# Patient Record
Sex: MALE | Race: WHITE | NOT HISPANIC OR LATINO | Employment: OTHER | URBAN - METROPOLITAN AREA
[De-identification: names, ages, dates, MRNs, and addresses within clinical notes are randomized per-mention and may not be internally consistent; named-entity substitution may affect disease eponyms.]

---

## 2023-04-28 DIAGNOSIS — I10 PRIMARY HYPERTENSION: Primary | ICD-10-CM

## 2023-04-29 LAB
ALANINE AMINOTRANSFERASE (SGPT) (U/L) IN SER/PLAS: 7 U/L (ref 10–52)
ALBUMIN (G/DL) IN SER/PLAS: 4.5 G/DL (ref 3.4–5)
ALKALINE PHOSPHATASE (U/L) IN SER/PLAS: 86 U/L (ref 33–136)
ANION GAP IN SER/PLAS: 14 MMOL/L (ref 10–20)
ASPARTATE AMINOTRANSFERASE (SGOT) (U/L) IN SER/PLAS: 26 U/L (ref 9–39)
BILIRUBIN TOTAL (MG/DL) IN SER/PLAS: 1 MG/DL (ref 0–1.2)
CALCIUM (MG/DL) IN SER/PLAS: 9.5 MG/DL (ref 8.6–10.6)
CARBON DIOXIDE, TOTAL (MMOL/L) IN SER/PLAS: 29 MMOL/L (ref 21–32)
CHLORIDE (MMOL/L) IN SER/PLAS: 103 MMOL/L (ref 98–107)
CHOLESTEROL (MG/DL) IN SER/PLAS: 112 MG/DL (ref 0–199)
CHOLESTEROL IN HDL (MG/DL) IN SER/PLAS: 40.1 MG/DL
CHOLESTEROL/HDL RATIO: 2.8
CREATININE (MG/DL) IN SER/PLAS: 1.77 MG/DL (ref 0.5–1.3)
ERYTHROCYTE DISTRIBUTION WIDTH (RATIO) BY AUTOMATED COUNT: 13.5 % (ref 11.5–14.5)
ERYTHROCYTE MEAN CORPUSCULAR HEMOGLOBIN CONCENTRATION (G/DL) BY AUTOMATED: 32.4 G/DL (ref 32–36)
ERYTHROCYTE MEAN CORPUSCULAR VOLUME (FL) BY AUTOMATED COUNT: 94 FL (ref 80–100)
ERYTHROCYTES (10*6/UL) IN BLOOD BY AUTOMATED COUNT: 4.97 X10E12/L (ref 4.5–5.9)
ESTIMATED AVERAGE GLUCOSE FOR HBA1C: 146 MG/DL
GFR MALE: 41 ML/MIN/1.73M2
GLUCOSE (MG/DL) IN SER/PLAS: 170 MG/DL (ref 74–99)
HEMATOCRIT (%) IN BLOOD BY AUTOMATED COUNT: 46.6 % (ref 41–52)
HEMOGLOBIN (G/DL) IN BLOOD: 15.1 G/DL (ref 13.5–17.5)
HEMOGLOBIN A1C/HEMOGLOBIN TOTAL IN BLOOD: 6.7 %
LDL: 42 MG/DL (ref 0–99)
LEUKOCYTES (10*3/UL) IN BLOOD BY AUTOMATED COUNT: 11 X10E9/L (ref 4.4–11.3)
NRBC (PER 100 WBCS) BY AUTOMATED COUNT: 0 /100 WBC (ref 0–0)
PLATELETS (10*3/UL) IN BLOOD AUTOMATED COUNT: 204 X10E9/L (ref 150–450)
POTASSIUM (MMOL/L) IN SER/PLAS: 5.2 MMOL/L (ref 3.5–5.3)
PROSTATE SPECIFIC ANTIGEN,SCREEN: 0.63 NG/ML (ref 0–4)
PROTEIN TOTAL: 8 G/DL (ref 6.4–8.2)
SODIUM (MMOL/L) IN SER/PLAS: 141 MMOL/L (ref 136–145)
THYROTROPIN (MIU/L) IN SER/PLAS BY DETECTION LIMIT <= 0.05 MIU/L: 2.43 MIU/L (ref 0.44–3.98)
TRIGLYCERIDE (MG/DL) IN SER/PLAS: 150 MG/DL (ref 0–149)
UREA NITROGEN (MG/DL) IN SER/PLAS: 49 MG/DL (ref 6–23)
VLDL: 30 MG/DL (ref 0–40)

## 2023-05-01 RX ORDER — HYDROCHLOROTHIAZIDE 12.5 MG/1
TABLET ORAL
Qty: 90 TABLET | Refills: 1 | Status: SHIPPED | OUTPATIENT
Start: 2023-05-01 | End: 2023-05-24 | Stop reason: SDUPTHER

## 2023-05-08 ENCOUNTER — APPOINTMENT (OUTPATIENT)
Dept: PRIMARY CARE | Facility: CLINIC | Age: 73
End: 2023-05-08
Payer: COMMERCIAL

## 2023-05-15 ENCOUNTER — APPOINTMENT (OUTPATIENT)
Dept: PRIMARY CARE | Facility: CLINIC | Age: 73
End: 2023-05-15
Payer: COMMERCIAL

## 2023-05-24 ENCOUNTER — OFFICE VISIT (OUTPATIENT)
Dept: PRIMARY CARE | Facility: CLINIC | Age: 73
End: 2023-05-24
Payer: COMMERCIAL

## 2023-05-24 VITALS
WEIGHT: 282 LBS | HEART RATE: 88 BPM | OXYGEN SATURATION: 96 % | SYSTOLIC BLOOD PRESSURE: 130 MMHG | BODY MASS INDEX: 36.19 KG/M2 | DIASTOLIC BLOOD PRESSURE: 69 MMHG | HEIGHT: 74 IN

## 2023-05-24 DIAGNOSIS — I10 PRIMARY HYPERTENSION: ICD-10-CM

## 2023-05-24 DIAGNOSIS — E11.9 DIABETES MELLITUS WITHOUT COMPLICATION (MULTI): ICD-10-CM

## 2023-05-24 DIAGNOSIS — K74.69 OTHER CIRRHOSIS OF LIVER (MULTI): ICD-10-CM

## 2023-05-24 DIAGNOSIS — Z00.00 MEDICARE ANNUAL WELLNESS VISIT, SUBSEQUENT: Primary | ICD-10-CM

## 2023-05-24 DIAGNOSIS — E11.42 DIABETIC PERIPHERAL NEUROPATHY (MULTI): ICD-10-CM

## 2023-05-24 DIAGNOSIS — Z12.11 SCREENING FOR COLON CANCER: ICD-10-CM

## 2023-05-24 PROBLEM — E78.2 ELEVATED TRIGLYCERIDES WITH HIGH CHOLESTEROL: Status: ACTIVE | Noted: 2023-05-24

## 2023-05-24 PROBLEM — E78.2 MIXED HYPERLIPIDEMIA: Status: ACTIVE | Noted: 2018-09-12

## 2023-05-24 PROBLEM — E83.19 IRON OVERLOAD: Status: ACTIVE | Noted: 2023-05-24

## 2023-05-24 PROBLEM — K76.0 NAFLD (NONALCOHOLIC FATTY LIVER DISEASE): Status: ACTIVE | Noted: 2023-05-24

## 2023-05-24 PROBLEM — N18.30 CHRONIC KIDNEY DISEASE, STAGE 3 (MULTI): Status: ACTIVE | Noted: 2023-05-24

## 2023-05-24 PROBLEM — H25.812 MIXED TYPE AGE-RELATED CATARACT, LEFT EYE: Status: ACTIVE | Noted: 2023-05-24

## 2023-05-24 PROBLEM — K74.60 LIVER CIRRHOSIS (MULTI): Status: ACTIVE | Noted: 2023-05-24

## 2023-05-24 PROBLEM — M15.9 GENERALIZED OSTEOARTHROSIS, INVOLVING MULTIPLE SITES: Status: ACTIVE | Noted: 2023-05-24

## 2023-05-24 PROCEDURE — 1160F RVW MEDS BY RX/DR IN RCRD: CPT | Performed by: STUDENT IN AN ORGANIZED HEALTH CARE EDUCATION/TRAINING PROGRAM

## 2023-05-24 PROCEDURE — 1036F TOBACCO NON-USER: CPT | Performed by: STUDENT IN AN ORGANIZED HEALTH CARE EDUCATION/TRAINING PROGRAM

## 2023-05-24 PROCEDURE — G0439 PPPS, SUBSEQ VISIT: HCPCS | Performed by: STUDENT IN AN ORGANIZED HEALTH CARE EDUCATION/TRAINING PROGRAM

## 2023-05-24 PROCEDURE — 99214 OFFICE O/P EST MOD 30 MIN: CPT | Performed by: STUDENT IN AN ORGANIZED HEALTH CARE EDUCATION/TRAINING PROGRAM

## 2023-05-24 PROCEDURE — 3078F DIAST BP <80 MM HG: CPT | Performed by: STUDENT IN AN ORGANIZED HEALTH CARE EDUCATION/TRAINING PROGRAM

## 2023-05-24 PROCEDURE — 4010F ACE/ARB THERAPY RXD/TAKEN: CPT | Performed by: STUDENT IN AN ORGANIZED HEALTH CARE EDUCATION/TRAINING PROGRAM

## 2023-05-24 PROCEDURE — 3075F SYST BP GE 130 - 139MM HG: CPT | Performed by: STUDENT IN AN ORGANIZED HEALTH CARE EDUCATION/TRAINING PROGRAM

## 2023-05-24 PROCEDURE — 3044F HG A1C LEVEL LT 7.0%: CPT | Performed by: STUDENT IN AN ORGANIZED HEALTH CARE EDUCATION/TRAINING PROGRAM

## 2023-05-24 PROCEDURE — 1170F FXNL STATUS ASSESSED: CPT | Performed by: STUDENT IN AN ORGANIZED HEALTH CARE EDUCATION/TRAINING PROGRAM

## 2023-05-24 PROCEDURE — 1159F MED LIST DOCD IN RCRD: CPT | Performed by: STUDENT IN AN ORGANIZED HEALTH CARE EDUCATION/TRAINING PROGRAM

## 2023-05-24 RX ORDER — ASPIRIN 81 MG/1
81 TABLET ORAL DAILY
COMMUNITY
Start: 2018-01-18

## 2023-05-24 RX ORDER — CARBIDOPA AND LEVODOPA 25; 100 MG/1; MG/1
3 TABLET ORAL 3 TIMES DAILY
COMMUNITY
End: 2023-12-22

## 2023-05-24 RX ORDER — ATORVASTATIN CALCIUM 20 MG/1
20 TABLET, FILM COATED ORAL DAILY
COMMUNITY
End: 2023-10-12

## 2023-05-24 RX ORDER — LOSARTAN POTASSIUM 50 MG/1
50 TABLET ORAL DAILY
Qty: 90 TABLET | Refills: 1 | Status: SHIPPED | OUTPATIENT
Start: 2023-05-24 | End: 2023-10-23

## 2023-05-24 RX ORDER — DAPAGLIFLOZIN 10 MG/1
10 TABLET, FILM COATED ORAL DAILY
COMMUNITY
End: 2023-10-12

## 2023-05-24 RX ORDER — ALLOPURINOL 100 MG/1
100 TABLET ORAL DAILY
COMMUNITY
Start: 2022-03-02 | End: 2023-09-05

## 2023-05-24 RX ORDER — ACETAMINOPHEN 500 MG
500 TABLET ORAL
COMMUNITY
Start: 2021-02-11 | End: 2023-10-17 | Stop reason: SDUPTHER

## 2023-05-24 RX ORDER — HYDROCHLOROTHIAZIDE 12.5 MG/1
12.5 TABLET ORAL DAILY
Qty: 90 TABLET | Refills: 3 | Status: SHIPPED | OUTPATIENT
Start: 2023-05-24 | End: 2023-10-12

## 2023-05-24 RX ORDER — LISINOPRIL 40 MG/1
40 TABLET ORAL
COMMUNITY
End: 2023-05-24 | Stop reason: SINTOL

## 2023-05-24 RX ORDER — DULAGLUTIDE 1.5 MG/.5ML
1.5 INJECTION, SOLUTION SUBCUTANEOUS
COMMUNITY
Start: 2019-06-04 | End: 2023-09-05

## 2023-05-24 RX ORDER — MULTIVIT-MIN/FA/LYCOPEN/LUTEIN .4-300-25
1 TABLET ORAL DAILY
COMMUNITY

## 2023-05-24 RX ORDER — NIFEDIPINE 30 MG/1
30 TABLET, FILM COATED, EXTENDED RELEASE ORAL
COMMUNITY
End: 2023-10-12

## 2023-05-24 RX ORDER — METOPROLOL TARTRATE 50 MG/1
50 TABLET ORAL 2 TIMES DAILY
COMMUNITY
End: 2023-10-12

## 2023-05-24 ASSESSMENT — PATIENT HEALTH QUESTIONNAIRE - PHQ9
1. LITTLE INTEREST OR PLEASURE IN DOING THINGS: NOT AT ALL
2. FEELING DOWN, DEPRESSED OR HOPELESS: NOT AT ALL
SUM OF ALL RESPONSES TO PHQ9 QUESTIONS 1 AND 2: 0

## 2023-05-24 ASSESSMENT — ACTIVITIES OF DAILY LIVING (ADL)
DOING_HOUSEWORK: NEEDS ASSISTANCE
TAKING_MEDICATION: INDEPENDENT
DRESSING: INDEPENDENT
GROCERY_SHOPPING: NEEDS ASSISTANCE
BATHING: INDEPENDENT
MANAGING_FINANCES: INDEPENDENT

## 2023-05-24 NOTE — PROGRESS NOTES
"Subjective   Reason for Visit: Huber Lindsey is an 72 y.o. male here for a Medicare Wellness visit.     Past Medical, Surgical, and Family History reviewed and updated in chart.    Reviewed all medications by prescribing practitioner or clinical pharmacist (such as prescriptions, OTCs, herbal therapies and supplements) and documented in the medical record.    HPI     71y/o male with HTN , DM2, Hep C s/p completion of Rx, Parkinson'  Patient Care Team:  Chelsea Aguillon MD as PCP - General     Review of Systems    Constitutional: no chills, no fever and no night sweats.     Eyes: no blurred vision and no eyesight problems.     ENT: no hearing loss, no nasal congestion, no nasal discharge, no hoarseness and no sore throat.     Cardiovascular: no chest pain, no intermittent leg claudication, no lower extremity edema, no palpitations and no syncope.     Respiratory: no cough, no shortness of breath during exertion, no shortness of breath at rest and no wheezing.     Gastrointestinal: no abdominal pain, no constipation, no blood in stools, no diarrhea, no melena, no nausea, no rectal pain and no vomiting.     Genitourinary: no dysuria, no change in urinary frequency, no urinary hesitancy and no feelings of urinary urgency.     Musculoskeletal: no arthralgias, no back pain and no myalgias.     Integumentary: no new skin lesions and no rashes.     Neurological: no difficulty walking, no headache, no limb weakness, no numbness and no tingling.     Psychiatric: no anxiety, no depression, no anhedonia and no substance use disorders.     Endocrine: no recent weight gain and no recent weight loss.     Hematologic/Lymphatic: no tendency for easy bruising and no swollen glands.          All other systems have been reviewed and are negative for complaint.    Objective   Vitals:  /69   Pulse 88   Ht 1.88 m (6' 2\")   Wt 128 kg (282 lb)   SpO2 96%   BMI 36.21 kg/m²       Physical Exam  Constitutional: Alert and in " no acute distress. Well developed, well nourished.     Eyes: Normal external exam. Pupils were equal in size, round, reactive to light (PERRL) with normal accommodation and extraocular movements intact (EOMI).     Ears, Nose, Mouth, and Throat: External inspection of ears and nose: Normal.  Otoscopic examination: Normal.      Neck: No neck mass was observed. Supple.     Cardiovascular: Heart rate and rhythm were normal, normal S1 and S2, no gallops, no murmurs and no pericardial rub    Pulmonary: No respiratory distress. Clear bilateral breath sounds.     Abdomen:  limited due to obesity     Musculoskeletal: No joint swelling seen, normal movements of all extremities. Range of motion: Normal.  Muscle strength/tone: Normal.          Neurologic: Deep tendon reflexes were 2+ and symmetric. Sensation: Normal.     Psychiatric: Judgment and insight: Intact. Mood and affect: Normal.    Assessment/Plan   Problem List Items Addressed This Visit          Nervous    Diabetic peripheral neuropathy (CMS/HCC)       Circulatory    HTN (hypertension)    Overview     Formatting of this note might be different from the original. Overview: Continue Amlodipine and lisinopril Hold HCTZ         Relevant Medications    hydroCHLOROthiazide (HYDRODiuril) 12.5 mg tablet    losartan (Cozaar) 50 mg tablet    Other Relevant Orders    Basic Metabolic Panel       Digestive    Liver cirrhosis (CMS/HCC)     Other Visit Diagnoses       Medicare annual wellness visit, subsequent    -  Primary    Diabetes mellitus without complication (CMS/HCC)        Screening for colon cancer        Relevant Orders    Cologuard® colon cancer screening        73y/o male with HTN , DM2, Hep C s/p completion of Rx, Parkinson'    Chronic medical conditions: Reviewed , assessed , stable, meds refilled.   - HTN : Stopped Lisinopril due to hyperkalemia , start Losartan 50mg   Rpt  K levels in 1m after starting Losartan 50mg   - DM2     Conditions addressed and mgmt as  noted above.  Pertinent labs, images/ imaging reports , chart review was done .   Age appropriate labs / labs for mgmt of chronic medical conditions ordered, further mgmt pending the results.       Influenza: influenza vaccine was previously given.   Pneumovax 23/Prevnar 15: Pneumovax 23/Prevnar 15 vaccine was previously given.   Prevnar 20: Prevnar 20 vaccine was previously given.   Shingles Vaccine: Shingles vaccine was given previously  Prostate cancer screening: Screening current  Colorectal Cancer Screening: Sister reported getting the colonoscopy done in the last 10 years, suggested that she call that physician's office and find out when he is due for the next one. Cologuard was reportedly received t home but never mailed back with stool sample . [place] guard ordered  Abdominal Aortic Aneurysm screening: screening not indicated.

## 2023-06-28 ENCOUNTER — LAB (OUTPATIENT)
Dept: LAB | Facility: LAB | Age: 73
End: 2023-06-28
Payer: COMMERCIAL

## 2023-06-28 DIAGNOSIS — I10 PRIMARY HYPERTENSION: ICD-10-CM

## 2023-06-28 DIAGNOSIS — E11.9 DIABETES MELLITUS WITHOUT COMPLICATION (MULTI): ICD-10-CM

## 2023-06-28 LAB
ANION GAP IN SER/PLAS: 14 MMOL/L (ref 10–20)
CALCIUM (MG/DL) IN SER/PLAS: 9.9 MG/DL (ref 8.6–10.6)
CARBON DIOXIDE, TOTAL (MMOL/L) IN SER/PLAS: 28 MMOL/L (ref 21–32)
CHLORIDE (MMOL/L) IN SER/PLAS: 101 MMOL/L (ref 98–107)
CREATININE (MG/DL) IN SER/PLAS: 1.55 MG/DL (ref 0.5–1.3)
GFR MALE: 48 ML/MIN/1.73M2
GLUCOSE (MG/DL) IN SER/PLAS: 172 MG/DL (ref 74–99)
POTASSIUM (MMOL/L) IN SER/PLAS: 5.3 MMOL/L (ref 3.5–5.3)
SODIUM (MMOL/L) IN SER/PLAS: 138 MMOL/L (ref 136–145)
UREA NITROGEN (MG/DL) IN SER/PLAS: 37 MG/DL (ref 6–23)

## 2023-06-28 PROCEDURE — 80048 BASIC METABOLIC PNL TOTAL CA: CPT

## 2023-06-28 PROCEDURE — 36415 COLL VENOUS BLD VENIPUNCTURE: CPT

## 2023-06-29 ENCOUNTER — TELEPHONE (OUTPATIENT)
Dept: PRIMARY CARE | Facility: CLINIC | Age: 73
End: 2023-06-29
Payer: COMMERCIAL

## 2023-06-29 NOTE — TELEPHONE ENCOUNTER
----- Message from Chelsea Aguillon MD sent at 6/29/2023  2:42 PM EDT -----  Potassium levels are normal

## 2023-08-02 LAB
ALBUMIN (G/DL) IN SER/PLAS: 4.4 G/DL (ref 3.4–5)
ALBUMIN (MG/L) IN URINE: 7.9 MG/L
ALBUMIN/CREATININE (UG/MG) IN URINE: 8.5 UG/MG CRT (ref 0–30)
ANION GAP IN SER/PLAS: 13 MMOL/L (ref 10–20)
APPEARANCE, URINE: CLEAR
BILIRUBIN, URINE: NEGATIVE
BLOOD, URINE: NEGATIVE
CALCIDIOL (25 OH VITAMIN D3) (NG/ML) IN SER/PLAS: 27 NG/ML
CALCIUM (MG/DL) IN SER/PLAS: 9.7 MG/DL (ref 8.6–10.6)
CARBON DIOXIDE, TOTAL (MMOL/L) IN SER/PLAS: 29 MMOL/L (ref 21–32)
CHLORIDE (MMOL/L) IN SER/PLAS: 104 MMOL/L (ref 98–107)
COLOR, URINE: YELLOW
CREATININE (MG/DL) IN SER/PLAS: 1.68 MG/DL (ref 0.5–1.3)
CREATININE (MG/DL) IN URINE: 93.3 MG/DL (ref 20–370)
CREATININE (MG/DL) IN URINE: 93.3 MG/DL (ref 20–370)
ERYTHROCYTE DISTRIBUTION WIDTH (RATIO) BY AUTOMATED COUNT: 14 % (ref 11.5–14.5)
ERYTHROCYTE MEAN CORPUSCULAR HEMOGLOBIN CONCENTRATION (G/DL) BY AUTOMATED: 32.4 G/DL (ref 32–36)
ERYTHROCYTE MEAN CORPUSCULAR VOLUME (FL) BY AUTOMATED COUNT: 94 FL (ref 80–100)
ERYTHROCYTES (10*6/UL) IN BLOOD BY AUTOMATED COUNT: 4.94 X10E12/L (ref 4.5–5.9)
GFR MALE: 43 ML/MIN/1.73M2
GLUCOSE (MG/DL) IN SER/PLAS: 195 MG/DL (ref 74–99)
GLUCOSE, URINE: ABNORMAL MG/DL
HEMATOCRIT (%) IN BLOOD BY AUTOMATED COUNT: 46.6 % (ref 41–52)
HEMOGLOBIN (G/DL) IN BLOOD: 15.1 G/DL (ref 13.5–17.5)
KETONES, URINE: ABNORMAL MG/DL
LEUKOCYTE ESTERASE, URINE: NEGATIVE
LEUKOCYTES (10*3/UL) IN BLOOD BY AUTOMATED COUNT: 10.3 X10E9/L (ref 4.4–11.3)
NITRITE, URINE: NEGATIVE
NRBC (PER 100 WBCS) BY AUTOMATED COUNT: 0 /100 WBC (ref 0–0)
PH, URINE: 5 (ref 5–8)
PHOSPHATE (MG/DL) IN SER/PLAS: 2.7 MG/DL (ref 2.5–4.9)
PLATELETS (10*3/UL) IN BLOOD AUTOMATED COUNT: 225 X10E9/L (ref 150–450)
POTASSIUM (MMOL/L) IN SER/PLAS: 4.6 MMOL/L (ref 3.5–5.3)
PROTEIN (MG/DL) IN URINE: 9 MG/DL (ref 5–25)
PROTEIN, URINE: NEGATIVE MG/DL
PROTEIN/CREATININE (MG/MG) IN URINE: 0.1 MG/MG CREAT (ref 0–0.17)
SODIUM (MMOL/L) IN SER/PLAS: 141 MMOL/L (ref 136–145)
SPECIFIC GRAVITY, URINE: 1.02 (ref 1–1.03)
UREA NITROGEN (MG/DL) IN SER/PLAS: 41 MG/DL (ref 6–23)
UROBILINOGEN, URINE: <2 MG/DL (ref 0–1.9)

## 2023-08-03 LAB
IMMUNOGLOBULIN LIGHT CHAINS KAPPA/LAMBDA (MASS RATIO) IN SERUM: 1.69 (ref 0.26–1.65)
IMMUNOGLOBULIN LIGHT CHAINS.KAPPA (MG/DL) IN SERUM: 6.55 MG/DL (ref 0.33–1.94)
IMMUNOGLOBULIN LIGHT CHAINS.LAMBDA (MG/DL) IN SERUM: 3.88 MG/DL (ref 0.57–2.63)
PARATHYRIN INTACT (PG/ML) IN SER/PLAS: 96.8 PG/ML (ref 18.5–88)

## 2023-08-06 LAB
ALBUMIN ELP: 4.2 G/DL (ref 3.4–5)
ALPHA 1: 0.4 G/DL (ref 0.2–0.6)
ALPHA 2: 0.9 G/DL (ref 0.4–1.1)
BETA: 0.9 G/DL (ref 0.5–1.2)
GAMMA GLOBULIN: 1.5 G/DL (ref 0.5–1.4)
PATH REVIEW - SERUM IMMUNOFIXATION: NORMAL
PATH REVIEW-SERUM PROTEIN ELECTROPHORESIS: NORMAL
PROTEIN ELECTROPHORESIS INTERPRETATION: ABNORMAL
PROTEIN TOTAL: 7.9 G/DL (ref 6.4–8.2)
SERUM IMMUNOFIXATION INTERPRETATION: NORMAL

## 2023-08-17 PROBLEM — R03.0 ELEVATED BLOOD-PRESSURE READING WITHOUT DIAGNOSIS OF HYPERTENSION: Status: ACTIVE | Noted: 2019-09-11

## 2023-08-17 PROBLEM — E79.0 HYPERURICEMIA: Status: ACTIVE | Noted: 2023-08-17

## 2023-08-17 PROBLEM — E66.812 CLASS 2 SEVERE OBESITY WITH SERIOUS COMORBIDITY AND BODY MASS INDEX (BMI) OF 36.0 TO 36.9 IN ADULT: Status: ACTIVE | Noted: 2023-08-17

## 2023-08-17 PROBLEM — H54.7 VISION IMPAIRMENT: Status: ACTIVE | Noted: 2023-08-17

## 2023-08-17 PROBLEM — Z79.85 LONG-TERM (CURRENT) USE OF INJECTABLE NON-INSULIN ANTIDIABETIC DRUGS: Status: ACTIVE | Noted: 2023-04-13

## 2023-08-17 PROBLEM — H25.812 COMBINED FORMS OF AGE-RELATED CATARACT OF LEFT EYE: Status: ACTIVE | Noted: 2023-04-13

## 2023-08-17 PROBLEM — L97.811: Status: ACTIVE | Noted: 2023-08-17

## 2023-08-17 PROBLEM — M25.552 LEFT HIP PAIN: Status: ACTIVE | Noted: 2023-08-17

## 2023-08-17 PROBLEM — H25.89 TOTAL, MATURE SENILE CATARACT: Status: ACTIVE | Noted: 2023-08-17

## 2023-08-17 PROBLEM — Z87.891 PERSONAL HISTORY OF NICOTINE DEPENDENCE: Status: ACTIVE | Noted: 2023-04-13

## 2023-08-17 PROBLEM — R55 SYNCOPE: Status: ACTIVE | Noted: 2017-12-19

## 2023-08-17 PROBLEM — E11.42 TYPE 2 DIABETES MELLITUS WITH DIABETIC POLYNEUROPATHY (MULTI): Status: ACTIVE | Noted: 2023-04-13

## 2023-08-17 PROBLEM — E66.01 CLASS 2 SEVERE OBESITY WITH SERIOUS COMORBIDITY AND BODY MASS INDEX (BMI) OF 36.0 TO 36.9 IN ADULT (MULTI): Status: ACTIVE | Noted: 2023-08-17

## 2023-08-17 PROBLEM — M70.61 TROCHANTERIC BURSITIS OF RIGHT HIP: Status: ACTIVE | Noted: 2023-08-17

## 2023-08-17 PROBLEM — Z79.84 LONG TERM (CURRENT) USE OF ORAL HYPOGLYCEMIC DRUGS: Status: ACTIVE | Noted: 2023-04-13

## 2023-08-17 PROBLEM — Z79.82 LONG TERM (CURRENT) USE OF ASPIRIN: Status: ACTIVE | Noted: 2023-04-13

## 2023-08-17 PROBLEM — M15.9 POLYOSTEOARTHRITIS: Status: ACTIVE | Noted: 2023-04-13

## 2023-08-17 PROBLEM — H61.23 BILATERAL IMPACTED CERUMEN: Status: ACTIVE | Noted: 2023-04-13

## 2023-08-17 PROBLEM — G20.A1 PARKINSONS DISEASE (MULTI): Status: ACTIVE | Noted: 2023-04-13

## 2023-08-17 PROBLEM — R76.8 POSITIVE HEPATITIS C ANTIBODY TEST: Status: ACTIVE | Noted: 2023-08-17

## 2023-08-17 PROBLEM — R60.0 EDEMA, LOWER EXTREMITY: Status: ACTIVE | Noted: 2023-08-17

## 2023-08-17 PROBLEM — I12.9 HYPERTENSIVE CHRONIC KIDNEY DISEASE W STG 1-4/UNSP CHR KDNY: Status: ACTIVE | Noted: 2023-04-13

## 2023-08-17 PROBLEM — E11.51 TYPE 2 DIABETES MELLITUS WITH DIABETIC PERIPHERAL ANGIOPATHY WITHOUT GANGRENE (MULTI): Status: ACTIVE | Noted: 2023-04-13

## 2023-08-17 PROBLEM — R53.1 GENERALIZED WEAKNESS: Status: ACTIVE | Noted: 2023-08-17

## 2023-08-17 PROBLEM — I25.10 CORONARY ARTERY DISEASE INVOLVING NATIVE CORONARY ARTERY OF NATIVE HEART WITHOUT ANGINA PECTORIS: Status: ACTIVE | Noted: 2023-04-13

## 2023-08-17 PROBLEM — Z91.199 NON-COMPLIANCE WITH TREATMENT: Status: ACTIVE | Noted: 2023-08-17

## 2023-08-17 PROBLEM — L97.811 NON-PRESSURE CHRONIC ULCER OF OTHER PART OF RIGHT LOWER LEG LIMITED TO BREAKDOWN OF SKIN (MULTI): Status: ACTIVE | Noted: 2023-08-17

## 2023-08-17 PROBLEM — I87.2 VENOUS INSUFFICIENCY (CHRONIC) (PERIPHERAL): Status: RESOLVED | Noted: 2023-04-13 | Resolved: 2023-08-17

## 2023-08-17 PROBLEM — E66.811 OBESITY, CLASS I, BMI 30-34.9: Status: ACTIVE | Noted: 2018-05-14

## 2023-08-17 PROBLEM — R26.9 GAIT DISORDER: Status: ACTIVE | Noted: 2023-08-17

## 2023-08-17 PROBLEM — R79.0 RAISED SERUM IRON: Status: ACTIVE | Noted: 2023-08-17

## 2023-08-17 PROBLEM — M25.551 RIGHT HIP PAIN: Status: ACTIVE | Noted: 2023-08-17

## 2023-08-17 PROBLEM — E66.9 OBESITY, CLASS II, BMI 35-39.9: Status: ACTIVE | Noted: 2019-10-29

## 2023-08-17 PROBLEM — R74.01 TRANSAMINITIS: Status: ACTIVE | Noted: 2023-08-17

## 2023-08-17 PROBLEM — E66.812 OBESITY, CLASS II, BMI 35-39.9: Status: ACTIVE | Noted: 2019-10-29

## 2023-08-17 PROBLEM — Z86.39 H/O HYPOGLYCEMIA: Status: ACTIVE | Noted: 2023-08-17

## 2023-08-17 PROBLEM — R25.1 INTERMITTENT TREMOR: Status: ACTIVE | Noted: 2023-08-17

## 2023-08-17 PROBLEM — N17.9 AKI (ACUTE KIDNEY INJURY) (CMS-HCC): Status: ACTIVE | Noted: 2023-08-17

## 2023-08-17 PROBLEM — H54.7 VISUAL LOSS: Status: ACTIVE | Noted: 2023-04-13

## 2023-08-17 PROBLEM — E87.5 HYPERKALEMIA: Status: ACTIVE | Noted: 2023-08-17

## 2023-08-17 PROBLEM — M25.561 RIGHT KNEE PAIN: Status: ACTIVE | Noted: 2023-08-17

## 2023-08-17 PROBLEM — E11.22 TYPE 2 DIABETES MELLITUS WITH DIABETIC CHRONIC KIDNEY DISEASE (MULTI): Status: ACTIVE | Noted: 2023-04-13

## 2023-08-17 PROBLEM — K52.9 NONINFECTIOUS GASTROENTERITIS: Status: ACTIVE | Noted: 2019-09-11

## 2023-08-17 PROBLEM — Z87.440 PERSONAL HISTORY OF URINARY (TRACT) INFECTIONS: Status: ACTIVE | Noted: 2023-04-13

## 2023-08-17 PROBLEM — R82.90 MALODOROUS URINE: Status: ACTIVE | Noted: 2023-08-17

## 2023-08-17 PROBLEM — E66.9 OBESITY, CLASS I, BMI 30-34.9: Status: ACTIVE | Noted: 2018-05-14

## 2023-08-17 PROBLEM — I87.2 VENOUS INCOMPETENCE: Status: ACTIVE | Noted: 2023-08-17

## 2023-08-17 PROBLEM — R79.89 HIGH SERUM TRANSFERRIN SATURATION: Status: ACTIVE | Noted: 2023-08-17

## 2023-08-17 RX ORDER — AMLODIPINE AND VALSARTAN 10; 160 MG/1; MG/1
10 TABLET ORAL DAILY
COMMUNITY
End: 2023-10-17

## 2023-08-17 RX ORDER — FAMOTIDINE 20 MG/1
1 TABLET, FILM COATED ORAL NIGHTLY
COMMUNITY
Start: 2021-02-11

## 2023-08-17 RX ORDER — FLUTICASONE PROPIONATE 50 MCG
SPRAY, SUSPENSION (ML) NASAL
COMMUNITY
Start: 2019-08-06

## 2023-08-17 RX ORDER — POLYETHYLENE GLYCOL 3350 17 G/17G
1 POWDER, FOR SOLUTION ORAL DAILY
COMMUNITY
Start: 2021-02-11 | End: 2023-10-17

## 2023-08-17 RX ORDER — DEXTROSE 40 %
GEL (GRAM) ORAL
COMMUNITY
Start: 2018-01-22 | End: 2023-10-17

## 2023-08-17 RX ORDER — ACETAMINOPHEN 325 MG/1
650 TABLET ORAL EVERY 8 HOURS PRN
COMMUNITY
Start: 2019-11-26 | End: 2023-10-17

## 2023-08-17 RX ORDER — LISINOPRIL 20 MG/1
20 TABLET ORAL DAILY
COMMUNITY
End: 2023-10-17

## 2023-08-17 RX ORDER — METOPROLOL TARTRATE 25 MG/1
25 TABLET, FILM COATED ORAL EVERY 12 HOURS
COMMUNITY
Start: 2020-01-13 | End: 2023-10-17 | Stop reason: SDUPTHER

## 2023-08-17 RX ORDER — INSULIN GLARGINE 100 [IU]/ML
INJECTION, SOLUTION SUBCUTANEOUS NIGHTLY
COMMUNITY
End: 2023-10-17

## 2023-08-17 RX ORDER — ACETAMINOPHEN 500 MG
500 TABLET ORAL 2 TIMES DAILY
COMMUNITY
Start: 2023-03-01

## 2023-08-17 RX ORDER — DOCUSATE SODIUM 100 MG/1
100 CAPSULE, LIQUID FILLED ORAL 2 TIMES DAILY
COMMUNITY
Start: 2023-03-01 | End: 2023-10-17

## 2023-08-17 RX ORDER — GLIPIZIDE 10 MG/1
10 TABLET ORAL
COMMUNITY
End: 2023-10-17

## 2023-08-17 RX ORDER — DULAGLUTIDE 0.75 MG/.5ML
INJECTION, SOLUTION SUBCUTANEOUS
COMMUNITY
End: 2023-10-17

## 2023-08-17 RX ORDER — DOXYCYCLINE 100 MG/1
100 CAPSULE ORAL 2 TIMES DAILY
COMMUNITY
Start: 2023-01-18 | End: 2023-10-17

## 2023-08-17 RX ORDER — LISINOPRIL 40 MG/1
TABLET ORAL
COMMUNITY
Start: 2021-08-13 | End: 2023-10-17

## 2023-08-31 DIAGNOSIS — E79.0 HYPERURICEMIA: ICD-10-CM

## 2023-08-31 DIAGNOSIS — E11.9 DIABETES MELLITUS WITHOUT COMPLICATION (MULTI): ICD-10-CM

## 2023-08-31 DIAGNOSIS — M1A.9XX0 CHRONIC GOUT WITHOUT TOPHUS, UNSPECIFIED CAUSE, UNSPECIFIED SITE: ICD-10-CM

## 2023-09-05 RX ORDER — ALLOPURINOL 100 MG/1
100 TABLET ORAL DAILY
Qty: 90 TABLET | Refills: 3 | Status: SHIPPED | OUTPATIENT
Start: 2023-09-05

## 2023-09-05 RX ORDER — DULAGLUTIDE 1.5 MG/.5ML
1.5 INJECTION, SOLUTION SUBCUTANEOUS
Qty: 6 ML | Refills: 3 | Status: SHIPPED | OUTPATIENT
Start: 2023-09-05

## 2023-10-11 DIAGNOSIS — E11.9 DIABETES MELLITUS WITHOUT COMPLICATION (MULTI): Primary | ICD-10-CM

## 2023-10-11 DIAGNOSIS — I10 PRIMARY HYPERTENSION: ICD-10-CM

## 2023-10-11 DIAGNOSIS — E78.2 MIXED HYPERLIPIDEMIA: ICD-10-CM

## 2023-10-12 RX ORDER — DAPAGLIFLOZIN 10 MG/1
10 TABLET, FILM COATED ORAL
Qty: 90 TABLET | Refills: 1 | Status: SHIPPED | OUTPATIENT
Start: 2023-10-12 | End: 2024-03-19

## 2023-10-12 RX ORDER — NIFEDIPINE 30 MG/1
30 TABLET, FILM COATED, EXTENDED RELEASE ORAL DAILY
Qty: 90 TABLET | Refills: 1 | Status: SHIPPED | OUTPATIENT
Start: 2023-10-12 | End: 2024-03-19

## 2023-10-12 RX ORDER — HYDROCHLOROTHIAZIDE 12.5 MG/1
12.5 TABLET ORAL DAILY
Qty: 90 TABLET | Refills: 1 | Status: SHIPPED | OUTPATIENT
Start: 2023-10-12 | End: 2024-03-19

## 2023-10-12 RX ORDER — ATORVASTATIN CALCIUM 20 MG/1
20 TABLET, FILM COATED ORAL DAILY
Qty: 90 TABLET | Refills: 3 | Status: SHIPPED | OUTPATIENT
Start: 2023-10-12

## 2023-10-12 RX ORDER — METOPROLOL TARTRATE 50 MG/1
50 TABLET ORAL EVERY 12 HOURS
Qty: 180 TABLET | Refills: 1 | Status: SHIPPED | OUTPATIENT
Start: 2023-10-12 | End: 2024-03-19

## 2023-10-17 ENCOUNTER — OFFICE VISIT (OUTPATIENT)
Dept: NEUROLOGY | Facility: CLINIC | Age: 73
End: 2023-10-17
Payer: COMMERCIAL

## 2023-10-17 VITALS
RESPIRATION RATE: 18 BRPM | BODY MASS INDEX: 37.23 KG/M2 | SYSTOLIC BLOOD PRESSURE: 126 MMHG | DIASTOLIC BLOOD PRESSURE: 79 MMHG | WEIGHT: 290 LBS | HEART RATE: 97 BPM

## 2023-10-17 DIAGNOSIS — G20.A1 PARKINSON'S DISEASE WITHOUT DYSKINESIA OR FLUCTUATING MANIFESTATIONS (MULTI): Primary | ICD-10-CM

## 2023-10-17 PROCEDURE — 1160F RVW MEDS BY RX/DR IN RCRD: CPT | Performed by: PSYCHIATRY & NEUROLOGY

## 2023-10-17 PROCEDURE — 1159F MED LIST DOCD IN RCRD: CPT | Performed by: PSYCHIATRY & NEUROLOGY

## 2023-10-17 PROCEDURE — 1125F AMNT PAIN NOTED PAIN PRSNT: CPT | Performed by: PSYCHIATRY & NEUROLOGY

## 2023-10-17 PROCEDURE — 4010F ACE/ARB THERAPY RXD/TAKEN: CPT | Performed by: PSYCHIATRY & NEUROLOGY

## 2023-10-17 PROCEDURE — 3074F SYST BP LT 130 MM HG: CPT | Performed by: PSYCHIATRY & NEUROLOGY

## 2023-10-17 PROCEDURE — 3078F DIAST BP <80 MM HG: CPT | Performed by: PSYCHIATRY & NEUROLOGY

## 2023-10-17 PROCEDURE — 1036F TOBACCO NON-USER: CPT | Performed by: PSYCHIATRY & NEUROLOGY

## 2023-10-17 PROCEDURE — 3044F HG A1C LEVEL LT 7.0%: CPT | Performed by: PSYCHIATRY & NEUROLOGY

## 2023-10-17 PROCEDURE — 99213 OFFICE O/P EST LOW 20 MIN: CPT | Performed by: PSYCHIATRY & NEUROLOGY

## 2023-10-17 RX ORDER — CARBIDOPA AND LEVODOPA 25; 100 MG/1; MG/1
1 TABLET ORAL 3 TIMES DAILY
Qty: 810 TABLET | Refills: 4 | Status: SHIPPED | OUTPATIENT
Start: 2023-10-17 | End: 2024-03-25

## 2023-10-17 ASSESSMENT — PATIENT HEALTH QUESTIONNAIRE - PHQ9
SUM OF ALL RESPONSES TO PHQ9 QUESTIONS 1 AND 2: 0
1. LITTLE INTEREST OR PLEASURE IN DOING THINGS: NOT AT ALL
2. FEELING DOWN, DEPRESSED OR HOPELESS: NOT AT ALL

## 2023-10-17 ASSESSMENT — ENCOUNTER SYMPTOMS
OCCASIONAL FEELINGS OF UNSTEADINESS: 1
LOSS OF SENSATION IN FEET: 0
DEPRESSION: 0

## 2023-10-17 ASSESSMENT — PAIN SCALES - GENERAL: PAINLEVEL: 4

## 2023-10-17 NOTE — PATIENT INSTRUCTIONS
You were visited by Dr Gomez and Dr Hernandez today.  We keep the same dose.Continue Carbidopa/levodopa 3 tablets three times a day.

## 2023-10-18 NOTE — PROGRESS NOTES
Subjective     Huber Lindsey is a right handed  73 y.o. year old male who presents with Parkinson's Disease.   Visit type: follow up visit     HPI71 year old man with PMH of parkinsonism and DMII. No change in tremors. No falls. He always uses the walker.      Prior Hx:  Tremors began in 2018, action tremor in both hands. seen by Dr Cameron in Feb 2018. Tremor predominant Parkinson's disease Resting tremor intermittently seen on today's exam with re emergence during ambulation along with mild action tremors. CT head done due to gait disorder and urinary incontinence (occasional dribbling of urine) that showed ischemic changes but no signs of NPH.      Current PD Meds:  Carbidopa/levodopa 25/100 3 tabs 3x/day. No AEs.     FMHx: Updated  -Sister with PD               Review of Systems  All other system have been reviewed and are negative for complaint.  Objective   Neurological Exam    Physical Exam    UPDRS Examination The patient is currently on medication.   Speech: 0 Facial Expression: 1   Rest Tremor: Head: 0 RUE: 0 LUE: 0.5 RLE: 0 LLE: 0   Action Tremor: RUE: 1 LUE: 2   Rigidity: Neck: 1 RUE: 1 LUE: 1 RLE: 0.5 LLE: 0.5   Finger Taps: RUE: 1 LUE: 1   Hand Movements: RUE: 1 LUE: 1.5   Rapid Alternating Movements: RUE: 1 LUE: 1.5   Leg Agility: RLE: 1 LLE: 1   Arising from chair: 2 Posture: 1 Gait: 1.5 Body Bradykinsia: 1   Yahaira and Yahr Stage:                                  Assessment/Plan Huber Lindsey is a right handed  73 y.o. year old male who presents with Parkinson's Disease for follow up visit.He is doing great,and we plan continue the medication as is.Carbidopa/levodopa 3 tablets TID

## 2023-10-21 DIAGNOSIS — I10 PRIMARY HYPERTENSION: ICD-10-CM

## 2023-10-23 RX ORDER — LOSARTAN POTASSIUM 50 MG/1
50 TABLET ORAL DAILY
Qty: 90 TABLET | Refills: 3 | Status: SHIPPED | OUTPATIENT
Start: 2023-10-23

## 2023-11-29 ENCOUNTER — APPOINTMENT (OUTPATIENT)
Dept: PRIMARY CARE | Facility: CLINIC | Age: 73
End: 2023-11-29
Payer: COMMERCIAL

## 2023-11-30 ENCOUNTER — APPOINTMENT (OUTPATIENT)
Dept: PRIMARY CARE | Facility: CLINIC | Age: 73
End: 2023-11-30
Payer: COMMERCIAL

## 2023-12-05 DIAGNOSIS — G20.A1 PARKINSON'S DISEASE, UNSPECIFIED WHETHER DYSKINESIA PRESENT, UNSPECIFIED WHETHER MANIFESTATIONS FLUCTUATE (MULTI): ICD-10-CM

## 2023-12-06 RX ORDER — CARBIDOPA AND LEVODOPA 25; 100 MG/1; MG/1
TABLET ORAL
Qty: 810 TABLET | Refills: 0 | Status: SHIPPED | OUTPATIENT
Start: 2023-12-06 | End: 2024-03-25 | Stop reason: SDUPTHER

## 2023-12-20 ENCOUNTER — LAB (OUTPATIENT)
Dept: LAB | Facility: LAB | Age: 73
End: 2023-12-20
Payer: COMMERCIAL

## 2023-12-20 DIAGNOSIS — E11.9 DIABETES MELLITUS WITHOUT COMPLICATION (MULTI): ICD-10-CM

## 2023-12-20 DIAGNOSIS — N18.31 STAGE 3A CHRONIC KIDNEY DISEASE (MULTI): ICD-10-CM

## 2023-12-20 LAB
EST. AVERAGE GLUCOSE BLD GHB EST-MCNC: 146 MG/DL
HBA1C MFR BLD: 6.7 %
URATE SERPL-MCNC: 7.2 MG/DL (ref 4–7.5)

## 2023-12-20 PROCEDURE — 80048 BASIC METABOLIC PNL TOTAL CA: CPT

## 2023-12-20 PROCEDURE — 36415 COLL VENOUS BLD VENIPUNCTURE: CPT

## 2023-12-20 PROCEDURE — 83036 HEMOGLOBIN GLYCOSYLATED A1C: CPT

## 2023-12-20 PROCEDURE — 84550 ASSAY OF BLOOD/URIC ACID: CPT

## 2023-12-22 ENCOUNTER — OFFICE VISIT (OUTPATIENT)
Dept: PRIMARY CARE | Facility: CLINIC | Age: 73
End: 2023-12-22
Payer: COMMERCIAL

## 2023-12-22 VITALS
HEART RATE: 90 BPM | HEIGHT: 74 IN | BODY MASS INDEX: 35.58 KG/M2 | WEIGHT: 277.2 LBS | SYSTOLIC BLOOD PRESSURE: 130 MMHG | DIASTOLIC BLOOD PRESSURE: 79 MMHG | OXYGEN SATURATION: 93 %

## 2023-12-22 DIAGNOSIS — E11.22 TYPE 2 DIABETES MELLITUS WITH STAGE 3B CHRONIC KIDNEY DISEASE, WITHOUT LONG-TERM CURRENT USE OF INSULIN (MULTI): Primary | ICD-10-CM

## 2023-12-22 DIAGNOSIS — N18.31 STAGE 3A CHRONIC KIDNEY DISEASE (MULTI): ICD-10-CM

## 2023-12-22 DIAGNOSIS — D64.9 ANEMIA, UNSPECIFIED TYPE: ICD-10-CM

## 2023-12-22 DIAGNOSIS — E11.51 TYPE 2 DIABETES MELLITUS WITH DIABETIC PERIPHERAL ANGIOPATHY WITHOUT GANGRENE, WITHOUT LONG-TERM CURRENT USE OF INSULIN (MULTI): ICD-10-CM

## 2023-12-22 DIAGNOSIS — N18.32 TYPE 2 DIABETES MELLITUS WITH STAGE 3B CHRONIC KIDNEY DISEASE, WITHOUT LONG-TERM CURRENT USE OF INSULIN (MULTI): Primary | ICD-10-CM

## 2023-12-22 PROBLEM — L97.811: Status: RESOLVED | Noted: 2023-08-17 | Resolved: 2023-12-22

## 2023-12-22 PROBLEM — E66.812 CLASS 2 SEVERE OBESITY WITH SERIOUS COMORBIDITY AND BODY MASS INDEX (BMI) OF 36.0 TO 36.9 IN ADULT: Status: RESOLVED | Noted: 2023-08-17 | Resolved: 2023-12-22

## 2023-12-22 PROBLEM — L97.811 NON-PRESSURE CHRONIC ULCER OF OTHER PART OF RIGHT LOWER LEG LIMITED TO BREAKDOWN OF SKIN (MULTI): Status: RESOLVED | Noted: 2023-08-17 | Resolved: 2023-12-22

## 2023-12-22 PROBLEM — E66.01 CLASS 2 SEVERE OBESITY WITH SERIOUS COMORBIDITY AND BODY MASS INDEX (BMI) OF 36.0 TO 36.9 IN ADULT (MULTI): Status: RESOLVED | Noted: 2023-08-17 | Resolved: 2023-12-22

## 2023-12-22 LAB
ANION GAP SERPL CALC-SCNC: 23 MMOL/L (ref 10–20)
BUN SERPL-MCNC: 40 MG/DL (ref 6–23)
CALCIUM SERPL-MCNC: 9.4 MG/DL (ref 8.6–10.6)
CHLORIDE SERPL-SCNC: 103 MMOL/L (ref 98–107)
CO2 SERPL-SCNC: 21 MMOL/L (ref 21–32)
CREAT SERPL-MCNC: 1.53 MG/DL (ref 0.5–1.3)
GFR SERPL CREATININE-BSD FRML MDRD: 48 ML/MIN/1.73M*2
GLUCOSE SERPL-MCNC: 199 MG/DL (ref 74–99)
POTASSIUM SERPL-SCNC: 4.2 MMOL/L (ref 3.5–5.3)
SODIUM SERPL-SCNC: 143 MMOL/L (ref 136–145)

## 2023-12-22 PROCEDURE — 99214 OFFICE O/P EST MOD 30 MIN: CPT | Performed by: STUDENT IN AN ORGANIZED HEALTH CARE EDUCATION/TRAINING PROGRAM

## 2023-12-22 PROCEDURE — 1036F TOBACCO NON-USER: CPT | Performed by: STUDENT IN AN ORGANIZED HEALTH CARE EDUCATION/TRAINING PROGRAM

## 2023-12-22 PROCEDURE — 3078F DIAST BP <80 MM HG: CPT | Performed by: STUDENT IN AN ORGANIZED HEALTH CARE EDUCATION/TRAINING PROGRAM

## 2023-12-22 PROCEDURE — 1160F RVW MEDS BY RX/DR IN RCRD: CPT | Performed by: STUDENT IN AN ORGANIZED HEALTH CARE EDUCATION/TRAINING PROGRAM

## 2023-12-22 PROCEDURE — 1125F AMNT PAIN NOTED PAIN PRSNT: CPT | Performed by: STUDENT IN AN ORGANIZED HEALTH CARE EDUCATION/TRAINING PROGRAM

## 2023-12-22 PROCEDURE — 3044F HG A1C LEVEL LT 7.0%: CPT | Performed by: STUDENT IN AN ORGANIZED HEALTH CARE EDUCATION/TRAINING PROGRAM

## 2023-12-22 PROCEDURE — 1159F MED LIST DOCD IN RCRD: CPT | Performed by: STUDENT IN AN ORGANIZED HEALTH CARE EDUCATION/TRAINING PROGRAM

## 2023-12-22 PROCEDURE — 4010F ACE/ARB THERAPY RXD/TAKEN: CPT | Performed by: STUDENT IN AN ORGANIZED HEALTH CARE EDUCATION/TRAINING PROGRAM

## 2023-12-22 PROCEDURE — 3075F SYST BP GE 130 - 139MM HG: CPT | Performed by: STUDENT IN AN ORGANIZED HEALTH CARE EDUCATION/TRAINING PROGRAM

## 2023-12-22 ASSESSMENT — ENCOUNTER SYMPTOMS
LOSS OF SENSATION IN FEET: 0
OCCASIONAL FEELINGS OF UNSTEADINESS: 1
DEPRESSION: 0

## 2023-12-22 NOTE — PROGRESS NOTES
"Subjective   Patient ID: Huber Lindsey is a 73 y.o. male who presents for Follow-up (7 month follow-up. ).        HPI    74y/o male with HTN , DM2, Hep C s/p completion of Rx, Parkinson'     Visit Vitals  /79   Pulse 90   Ht 1.88 m (6' 2\")   Wt 126 kg (277 lb 3.2 oz)   SpO2 93%   BMI 35.59 kg/m²   Smoking Status Former   BSA 2.57 m²      No LMP for male patient.     Review of Systems    Constitutional : No feeling poorly / fevers/ chills / night sweats/ fatigue   Cardiovascular : No CP /Palpitations/ lower extremity edema / syncope   Respiratory : No Cough /HELLER/Dyspnea at rest   Gastrointestinal : No abd pain / N/V  No bloody stools/ melena / constipation  Endo : No polyuria/polydipsia/ muscle weakness / sluggishness   CNS: No confusion / HA/ tingling/ numbness/ weakness of extremities  Psychiatric: No anxiety/ depression/ SI/HI    All other systems have been reviewed and are negative for complaint       Physical Exam    Constitutional : Vitals reviewed. Alert and in no distress  Cardiovascular : RRR, Normal S1, S2, No pericardial rub/ gallop, no peripheral edema   Pulmonary: No respiratory distress, CTAB   MSK : Normal gait and station , strength and tone     Neurologic : CNs 2-12 grossly intact , no obvious FNDs  Psych : A,Ox3, normal mood and affect      Assessment/Plan   Diagnoses and all orders for this visit:  Type 2 diabetes mellitus with stage 3b chronic kidney disease, without long-term current use of insulin (CMS/[facility])  -     CBC; Future  -     Hemoglobin A1C; Future  -     Lipid Panel; Future  -     TSH with reflex to Free T4 if abnormal; Future  -     Albumin , Urine Random; Future  Stage 3a chronic kidney disease (CMS/[facility])  -     Comprehensive Metabolic Panel; Future  -     Basic Metabolic Panel; Future  Anemia, unspecified type  -     Vitamin B12; Future  Type 2 diabetes mellitus with diabetic peripheral angiopathy without gangrene, without long-term current use of insulin (CMS/[facility])      74y/o " male with HTN , DM2, Hep C s/p completion of Rx, Parkinson'      Dm2 : AT GOAL    Htn: AT GOAL     Rto I N mAY   Conditions addressed and mgmt as noted above.  Pertinent labs, images/ imaging reports , chart review was done .   Age appropriate labs / labs for mgmt of chronic medical conditions ordered, further mgmt pending the results.

## 2024-02-01 ENCOUNTER — LAB (OUTPATIENT)
Dept: LAB | Facility: LAB | Age: 74
End: 2024-02-01
Payer: COMMERCIAL

## 2024-02-01 DIAGNOSIS — E87.5 HYPERKALEMIA: Primary | ICD-10-CM

## 2024-02-01 DIAGNOSIS — N18.30 CHRONIC KIDNEY DISEASE, STAGE 3 UNSPECIFIED (MULTI): Primary | ICD-10-CM

## 2024-02-01 LAB
25(OH)D3 SERPL-MCNC: 43 NG/ML (ref 30–100)
ALBUMIN SERPL BCP-MCNC: 4.4 G/DL (ref 3.4–5)
ANION GAP SERPL CALC-SCNC: 15 MMOL/L (ref 10–20)
APPEARANCE UR: CLEAR
BILIRUB UR STRIP.AUTO-MCNC: NEGATIVE MG/DL
BUN SERPL-MCNC: 38 MG/DL (ref 6–23)
CALCIUM SERPL-MCNC: 9.6 MG/DL (ref 8.6–10.6)
CHLORIDE SERPL-SCNC: 100 MMOL/L (ref 98–107)
CO2 SERPL-SCNC: 27 MMOL/L (ref 21–32)
COLOR UR: YELLOW
CREAT SERPL-MCNC: 1.67 MG/DL (ref 0.5–1.3)
CREAT UR-MCNC: 146.6 MG/DL (ref 20–370)
CREAT UR-MCNC: 146.6 MG/DL (ref 20–370)
EGFRCR SERPLBLD CKD-EPI 2021: 43 ML/MIN/1.73M*2
ERYTHROCYTE [DISTWIDTH] IN BLOOD BY AUTOMATED COUNT: 13.8 % (ref 11.5–14.5)
GLUCOSE SERPL-MCNC: 178 MG/DL (ref 74–99)
GLUCOSE UR STRIP.AUTO-MCNC: ABNORMAL MG/DL
HCT VFR BLD AUTO: 43.7 % (ref 41–52)
HGB BLD-MCNC: 14.7 G/DL (ref 13.5–17.5)
KETONES UR STRIP.AUTO-MCNC: ABNORMAL MG/DL
LEUKOCYTE ESTERASE UR QL STRIP.AUTO: NEGATIVE
MCH RBC QN AUTO: 30.4 PG (ref 26–34)
MCHC RBC AUTO-ENTMCNC: 33.6 G/DL (ref 32–36)
MCV RBC AUTO: 91 FL (ref 80–100)
MICROALBUMIN UR-MCNC: 9.7 MG/L
MICROALBUMIN/CREAT UR: 6.6 UG/MG CREAT
NITRITE UR QL STRIP.AUTO: NEGATIVE
NRBC BLD-RTO: 0 /100 WBCS (ref 0–0)
PH UR STRIP.AUTO: 5 [PH]
PHOSPHATE SERPL-MCNC: 3.1 MG/DL (ref 2.5–4.9)
PLATELET # BLD AUTO: 256 X10*3/UL (ref 150–450)
POTASSIUM SERPL-SCNC: 4.4 MMOL/L (ref 3.5–5.3)
PROT UR STRIP.AUTO-MCNC: NEGATIVE MG/DL
PROT UR-ACNC: 12 MG/DL (ref 5–25)
PROT/CREAT UR: 0.08 MG/MG CREAT (ref 0–0.17)
PTH-INTACT SERPL-MCNC: 147.8 PG/ML (ref 18.5–88)
RBC # BLD AUTO: 4.83 X10*6/UL (ref 4.5–5.9)
RBC # UR STRIP.AUTO: NEGATIVE /UL
SODIUM SERPL-SCNC: 138 MMOL/L (ref 136–145)
SP GR UR STRIP.AUTO: 1.02
UROBILINOGEN UR STRIP.AUTO-MCNC: <2 MG/DL
WBC # BLD AUTO: 12.6 X10*3/UL (ref 4.4–11.3)

## 2024-02-01 PROCEDURE — 84156 ASSAY OF PROTEIN URINE: CPT

## 2024-02-01 PROCEDURE — 36415 COLL VENOUS BLD VENIPUNCTURE: CPT

## 2024-02-01 PROCEDURE — 81003 URINALYSIS AUTO W/O SCOPE: CPT

## 2024-02-01 PROCEDURE — 85027 COMPLETE CBC AUTOMATED: CPT

## 2024-02-01 PROCEDURE — 82570 ASSAY OF URINE CREATININE: CPT

## 2024-02-01 PROCEDURE — 80069 RENAL FUNCTION PANEL: CPT

## 2024-02-01 PROCEDURE — 82043 UR ALBUMIN QUANTITATIVE: CPT

## 2024-02-01 PROCEDURE — 83970 ASSAY OF PARATHORMONE: CPT

## 2024-02-01 PROCEDURE — 82306 VITAMIN D 25 HYDROXY: CPT

## 2024-02-02 RX ORDER — SODIUM ZIRCONIUM CYCLOSILICATE 10 G/10G
POWDER, FOR SUSPENSION ORAL
Qty: 90 PACKET | Refills: 3 | Status: SHIPPED | OUTPATIENT
Start: 2024-02-02

## 2024-02-07 ENCOUNTER — OFFICE VISIT (OUTPATIENT)
Dept: NEPHROLOGY | Facility: CLINIC | Age: 74
End: 2024-02-07
Payer: COMMERCIAL

## 2024-02-07 VITALS
TEMPERATURE: 98 F | DIASTOLIC BLOOD PRESSURE: 82 MMHG | WEIGHT: 273.2 LBS | SYSTOLIC BLOOD PRESSURE: 109 MMHG | BODY MASS INDEX: 35.08 KG/M2 | HEART RATE: 82 BPM

## 2024-02-07 DIAGNOSIS — N18.32 STAGE 3B CHRONIC KIDNEY DISEASE (MULTI): Primary | ICD-10-CM

## 2024-02-07 PROCEDURE — 1036F TOBACCO NON-USER: CPT | Performed by: INTERNAL MEDICINE

## 2024-02-07 PROCEDURE — 1125F AMNT PAIN NOTED PAIN PRSNT: CPT | Performed by: INTERNAL MEDICINE

## 2024-02-07 PROCEDURE — 3079F DIAST BP 80-89 MM HG: CPT | Performed by: INTERNAL MEDICINE

## 2024-02-07 PROCEDURE — 1159F MED LIST DOCD IN RCRD: CPT | Performed by: INTERNAL MEDICINE

## 2024-02-07 PROCEDURE — 1160F RVW MEDS BY RX/DR IN RCRD: CPT | Performed by: INTERNAL MEDICINE

## 2024-02-07 PROCEDURE — 4010F ACE/ARB THERAPY RXD/TAKEN: CPT | Performed by: INTERNAL MEDICINE

## 2024-02-07 PROCEDURE — 99213 OFFICE O/P EST LOW 20 MIN: CPT | Performed by: INTERNAL MEDICINE

## 2024-02-07 PROCEDURE — 3061F NEG MICROALBUMINURIA REV: CPT | Performed by: INTERNAL MEDICINE

## 2024-02-07 PROCEDURE — 3074F SYST BP LT 130 MM HG: CPT | Performed by: INTERNAL MEDICINE

## 2024-02-07 NOTE — PROGRESS NOTES
Chief Complaint: Follow up CKD    No specific complaints  Denies nausea, vomiting, chest pain, dyspnea  No urinary symptoms  Not taking lokelma    NAD  Sclera AI s inj  MMM, no sores  Deferred secondary to COVID  No edema  No tremor  No rash    CKD stage 3a-b  HTN well controlled  Proteinuria no significant  HPT    Labs and follow up in 3 months  No med changes

## 2024-02-12 ENCOUNTER — TELEPHONE (OUTPATIENT)
Dept: PRIMARY CARE | Facility: CLINIC | Age: 74
End: 2024-02-12
Payer: COMMERCIAL

## 2024-02-13 DIAGNOSIS — E11.9 DIABETES MELLITUS WITHOUT COMPLICATION (MULTI): ICD-10-CM

## 2024-02-13 RX ORDER — DULAGLUTIDE 0.75 MG/.5ML
0.75 INJECTION, SOLUTION SUBCUTANEOUS
Qty: 6 ML | Refills: 0 | Status: SHIPPED | OUTPATIENT
Start: 2024-02-13 | End: 2024-05-24 | Stop reason: ALTCHOICE

## 2024-03-18 DIAGNOSIS — I10 PRIMARY HYPERTENSION: ICD-10-CM

## 2024-03-18 DIAGNOSIS — E11.9 DIABETES MELLITUS WITHOUT COMPLICATION (MULTI): ICD-10-CM

## 2024-03-19 RX ORDER — NIFEDIPINE 30 MG/1
30 TABLET, FILM COATED, EXTENDED RELEASE ORAL DAILY
Qty: 90 TABLET | Refills: 1 | Status: SHIPPED | OUTPATIENT
Start: 2024-03-19

## 2024-03-19 RX ORDER — METOPROLOL TARTRATE 50 MG/1
50 TABLET ORAL EVERY 12 HOURS
Qty: 180 TABLET | Refills: 1 | Status: SHIPPED | OUTPATIENT
Start: 2024-03-19

## 2024-03-19 RX ORDER — DAPAGLIFLOZIN 10 MG/1
10 TABLET, FILM COATED ORAL EVERY MORNING
Qty: 90 TABLET | Refills: 1 | Status: SHIPPED | OUTPATIENT
Start: 2024-03-19

## 2024-03-19 RX ORDER — HYDROCHLOROTHIAZIDE 12.5 MG/1
12.5 TABLET ORAL DAILY
Qty: 90 TABLET | Refills: 1 | Status: SHIPPED | OUTPATIENT
Start: 2024-03-19 | End: 2024-05-24 | Stop reason: SDUPTHER

## 2024-03-25 DIAGNOSIS — G20.A1 PARKINSON'S DISEASE, UNSPECIFIED WHETHER DYSKINESIA PRESENT, UNSPECIFIED WHETHER MANIFESTATIONS FLUCTUATE (MULTI): ICD-10-CM

## 2024-03-25 RX ORDER — CARBIDOPA AND LEVODOPA 25; 100 MG/1; MG/1
TABLET ORAL
Qty: 810 TABLET | Refills: 0 | Status: SHIPPED | OUTPATIENT
Start: 2024-03-25

## 2024-04-18 ENCOUNTER — NURSING HOME VISIT (OUTPATIENT)
Dept: POST ACUTE CARE | Facility: EXTERNAL LOCATION | Age: 74
End: 2024-04-18
Payer: COMMERCIAL

## 2024-04-18 DIAGNOSIS — G20.A1 PARKINSON'S DISEASE, UNSPECIFIED WHETHER DYSKINESIA PRESENT, UNSPECIFIED WHETHER MANIFESTATIONS FLUCTUATE (MULTI): ICD-10-CM

## 2024-04-18 DIAGNOSIS — I10 PRIMARY HYPERTENSION: ICD-10-CM

## 2024-04-18 DIAGNOSIS — E11.9 TYPE 2 DIABETES MELLITUS WITHOUT COMPLICATION, UNSPECIFIED WHETHER LONG TERM INSULIN USE (MULTI): ICD-10-CM

## 2024-04-18 PROCEDURE — 99305 1ST NF CARE MODERATE MDM 35: CPT | Performed by: FAMILY MEDICINE

## 2024-04-18 NOTE — LETTER
Patient: Huber Lindsey  : 1950    Encounter Date: 2024    Huber Lindsey is a 73 y.o. male with Chief Complaint of [facility] H&P    Resident seen 2024 -- MELODY    CC: SN[facility] H&P    : 1950  SNF H&P done 24  DC Summary dated 24 reviewed 24  Allergy: Naproxen  Full Code    S: 72 yo man with stable PD, HTN, DM, Cirrhosis d/t hep C (Treated), depression admitted to SNF rehab after hospitalization for metabolic encephalopathy d/t UROSEPSIS. No cp, sob. Med List & Problem list reviewed.    O: VSS AFEB. 254# (24) Awake, Alert, NAD, oriented with mild baseline confusion. Chest cta. Heart rrr. Ext no c/c/e.    LAB (24) K 4.4, Cr 1.67, Na 138, GFR 43    A/P:  # PD: sinemet. SNF PT/OT. F/U w Neuro [facility].  # HTN: lopressor 50 DAILY, nifedipine 30, Losartan 50, HCTZ. OFF chlorthalidone since fall 7/10/2020.  # DM: stable on Trulicity 1.5, Farxiga, ASA, ARB, Statin. Off lantus.  # Edema: Off Lasix. Previous dry weight 256  # Gout: allopurinol 100 mg daily  # GERD: Pepcid.  # Hx UROSEPSIS: resolved.  .    Past Surgical History:   Procedure Laterality Date   • MR HEAD ANGIO WO IV CONTRAST  2013    MR HEAD ANGIO WO IV CONTRAST 2013 [facility]   • MR NECK ANGIO WO IV CONTRAST  2013    MR NECK ANGIO WO IV CONTRAST 2013 [facility]   • OTHER SURGICAL HISTORY  2018    Biopsy Of Liver   • RETINAL DETACHMENT SURGERY  2018    Repair Of Retinal Detachment   • US GUIDED NEEDLE LIVER BIOPSY  3/27/2018    US GUIDED NEEDLE LIVER BIOPSY 3/27/2018 [facility]      Social History     Socioeconomic History   • Marital status: Single     Spouse name: Not on file   • Number of children: Not on file   • Years of education: Not on file   • Highest education level: Not on file   Occupational History   • Not on file   Tobacco Use   • Smoking status: Former     Current packs/day: 0.00     Types: Cigarettes     Quit date: 1993     Years since  quittin.3   • Smokeless tobacco: Never   Substance and Sexual Activity   • Alcohol use: Not Currently   • Drug use: Not Currently   • Sexual activity: Not on file   Other Topics Concern   • Not on file   Social History Narrative   • Not on file     Social Determinants of Health     Financial Resource Strain: Not on file   Food Insecurity: No Food Insecurity (2024)    Received from [facility]    Hunger Vital Sign    • Worried About Running Out of Food in the Last Year: Never true    • Ran Out of Food in the Last Year: Never true   Transportation Needs: No Transportation Needs (2024)    Received from [facility]    PRAPARE - Transportation    • Lack of Transportation (Medical): No    • Lack of Transportation (Non-Medical): No   Physical Activity: Not on file   Stress: Not on file   Social Connections: Not on file   Intimate Partner Violence: Not on file   Housing Stability: High Risk (2024)    Received from [facility]    Housing Stability Vital Sign    • Unable to Pay for Housing in the Last Year: Yes    • Number of Places Lived in the Last Year: Not on file    • Unstable Housing in the Last Year: Yes     Past Medical History:   Diagnosis Date   • Age-related nuclear cataract, left eye 2019    Age-related nuclear cataract of left eye   • Age-related nuclear cataract, right eye 2019    Age-related nuclear cataract of right eye   • Chronic viral hepatitis C (Multi) 2022    Chronic hepatitis C   • Cortical age-related cataract, right eye 2019    Cortical age-related cataract of right eye   • Encounter for follow-up examination after completed treatment for conditions other than malignant neoplasm 2019    Hospital discharge follow-up   • Essential (primary) hypertension     Benign essential hypertension   • Essential (primary) hypertension 2022    Essential hypertension   • Parkinson's disease (Multi) 05/10/2022    Parkinsons disease   • Personal  history of other diseases of the circulatory system 12/04/2019    History of coronary artery disease   • Personal history of other diseases of the nervous system and sense organs 01/15/2020    History of peripheral neuropathy   • Personal history of other endocrine, nutritional and metabolic disease 10/11/2022    History of hyperkalemia   • Personal history of other specified conditions 01/15/2020    History of tachycardia   • Personal history of urinary (tract) infections 01/15/2020    History of urinary tract infection   • Polyosteoarthritis, unspecified 01/18/2018    Generalized osteoarthrosis, involving multiple sites   • Posterior subcapsular polar age-related cataract, right eye 02/19/2019    Posterior subcapsular polar age-related cataract of right eye   • Type 2 diabetes mellitus with mild nonproliferative diabetic retinopathy without macular edema, unspecified eye (Multi) 02/19/2019    NPDR (nonproliferative diabetic retinopathy)   • Type 2 diabetes mellitus without complications (Multi) 02/19/2019    Diabetes mellitus   • Type 2 diabetes mellitus without complications (Multi) 09/11/2021    Type 2 diabetes mellitus   • Unspecified cirrhosis of liver (Multi) 08/25/2022    Liver cirrhosis   • Unspecified visual loss 01/18/2018    Vision impairment      Family History   Problem Relation Name Age of Onset   • Other (cardiac disorder) Mother     • Diabetes type II Mother     • Other (cardiac disorder) Father          Review of Systems   Constitutional:  Negative for chills, fatigue and fever.   HENT:  Negative for rhinorrhea and sore throat.    Eyes:  Negative for pain and redness.   Respiratory:  Negative for cough and shortness of breath.    Cardiovascular:  Negative for chest pain and palpitations.   Gastrointestinal:  Negative for abdominal pain and blood in stool.   Endocrine: Negative for polydipsia and polyuria.   Genitourinary:  Negative for dysuria and hematuria.   Musculoskeletal:  Negative for back  pain and neck stiffness.   Skin:  Negative for rash and wound.   Allergic/Immunologic: Negative for environmental allergies and food allergies.   Neurological:  Positive for weakness. Negative for headaches.   Hematological:  Negative for adenopathy. Does not bruise/bleed easily.   Psychiatric/Behavioral:  Negative for hallucinations and suicidal ideas.       There were no vitals taken for this visit.  Physical Exam  Vitals reviewed.   Constitutional:       General: He is not in acute distress.     Appearance: He is not ill-appearing.   HENT:      Head: Normocephalic and atraumatic.      Right Ear: Tympanic membrane normal.      Left Ear: Tympanic membrane normal.      Nose: No congestion or rhinorrhea.      Mouth/Throat:      Pharynx: No oropharyngeal exudate or posterior oropharyngeal erythema.   Eyes:      Extraocular Movements: Extraocular movements intact.      Conjunctiva/sclera: Conjunctivae normal.      Pupils: Pupils are equal, round, and reactive to light.   Cardiovascular:      Rate and Rhythm: Normal rate and regular rhythm.      Heart sounds: No murmur heard.     No friction rub. No gallop.   Pulmonary:      Effort: Pulmonary effort is normal.      Breath sounds: Normal breath sounds. No wheezing, rhonchi or rales.   Abdominal:      General: There is no distension.      Palpations: Abdomen is soft.      Tenderness: There is no abdominal tenderness. There is no guarding or rebound.   Musculoskeletal:         General: No swelling or deformity.      Cervical back: Normal range of motion and neck supple.      Right lower leg: No edema.      Left lower leg: No edema.   Skin:     Capillary Refill: Capillary refill takes less than 2 seconds.      Coloration: Skin is not jaundiced.      Findings: No rash.   Neurological:      General: No focal deficit present.      Mental Status: He is alert.      Motor: Weakness present.   Psychiatric:         Mood and Affect: Mood normal.         Behavior: Behavior normal.  "      Lab Results   Component Value Date    WBC 12.6 (H) 02/01/2024    HGB 14.7 02/01/2024    HCT 43.7 02/01/2024    MCV 91 02/01/2024     02/01/2024     Lab Results   Component Value Date    CHOL 112 04/29/2023    CHOL 142 02/23/2022    CHOL 173 02/22/2021     Lab Results   Component Value Date    HDL 40.1 04/29/2023    HDL 33.9 (A) 02/23/2022    HDL 50.1 02/22/2021     No results found for: \"LDLCALC\"  Lab Results   Component Value Date    TRIG 150 (H) 04/29/2023    TRIG 254 (H) 02/23/2022    TRIG 161 (H) 02/22/2021     No components found for: \"CHOLHDL\"  Lab Results   Component Value Date    HGBA1C 6.8 (H) 04/17/2024       Assessment/Plan  Problem List Items Addressed This Visit       HTN (hypertension)    Overview     Formatting of this note might be different from the original. Overview: Continue Amlodipine and lisinopril Hold HCTZ         Type 2 diabetes mellitus (Multi)    Overview     Formatting of this note might be different from the original. Overview: On metformin at home HbA1C 6.5 BG elevated due to being on steroids. Plan: Hold metformin, SSI Formatting of this note might be different from the original. On metformin at home HbA1C 6.5 BG elevated due to being on steroids. Plan: Hold metformin, SSI         Parkinsons disease (Multi)       Electronically Signed By: Junior Beverly MD   4/30/24  3:27 PM  "

## 2024-04-19 ENCOUNTER — NURSING HOME VISIT (OUTPATIENT)
Dept: POST ACUTE CARE | Facility: EXTERNAL LOCATION | Age: 74
End: 2024-04-19
Payer: COMMERCIAL

## 2024-04-19 ENCOUNTER — TELEPHONE (OUTPATIENT)
Dept: PRIMARY CARE | Facility: CLINIC | Age: 74
End: 2024-04-19
Payer: COMMERCIAL

## 2024-04-19 DIAGNOSIS — G47.00 INSOMNIA, UNSPECIFIED TYPE: ICD-10-CM

## 2024-04-19 DIAGNOSIS — J31.0 RHINITIS, UNSPECIFIED TYPE: ICD-10-CM

## 2024-04-19 DIAGNOSIS — G20.A1 PARKINSON'S DISEASE, UNSPECIFIED WHETHER DYSKINESIA PRESENT, UNSPECIFIED WHETHER MANIFESTATIONS FLUCTUATE (MULTI): ICD-10-CM

## 2024-04-19 DIAGNOSIS — I10 PRIMARY HYPERTENSION: ICD-10-CM

## 2024-04-19 DIAGNOSIS — N30.00 ACUTE CYSTITIS WITHOUT HEMATURIA: ICD-10-CM

## 2024-04-19 DIAGNOSIS — J98.01 BRONCHOSPASM: ICD-10-CM

## 2024-04-19 DIAGNOSIS — M62.81 MUSCLE WEAKNESS (GENERALIZED): Primary | ICD-10-CM

## 2024-04-19 DIAGNOSIS — E78.5 DYSLIPIDEMIA: ICD-10-CM

## 2024-04-19 DIAGNOSIS — K21.9 GASTROESOPHAGEAL REFLUX DISEASE, UNSPECIFIED WHETHER ESOPHAGITIS PRESENT: ICD-10-CM

## 2024-04-19 DIAGNOSIS — M10.9 GOUT, UNSPECIFIED CAUSE, UNSPECIFIED CHRONICITY, UNSPECIFIED SITE: ICD-10-CM

## 2024-04-19 DIAGNOSIS — E11.9 TYPE 2 DIABETES MELLITUS WITHOUT COMPLICATION, UNSPECIFIED WHETHER LONG TERM INSULIN USE (MULTI): ICD-10-CM

## 2024-04-19 PROCEDURE — 99310 SBSQ NF CARE HIGH MDM 45: CPT | Performed by: NURSE PRACTITIONER

## 2024-04-22 ENCOUNTER — NURSING HOME VISIT (OUTPATIENT)
Dept: POST ACUTE CARE | Facility: EXTERNAL LOCATION | Age: 74
End: 2024-04-22
Payer: COMMERCIAL

## 2024-04-22 DIAGNOSIS — G20.A1 PARKINSON'S DISEASE, UNSPECIFIED WHETHER DYSKINESIA PRESENT, UNSPECIFIED WHETHER MANIFESTATIONS FLUCTUATE (MULTI): ICD-10-CM

## 2024-04-22 DIAGNOSIS — E11.29 TYPE II OR UNSPECIFIED TYPE DIABETES MELLITUS WITH RENAL MANIFESTATIONS, UNCONTROLLED(250.42) (MULTI): ICD-10-CM

## 2024-04-22 DIAGNOSIS — E11.65 TYPE II OR UNSPECIFIED TYPE DIABETES MELLITUS WITH RENAL MANIFESTATIONS, UNCONTROLLED(250.42) (MULTI): ICD-10-CM

## 2024-04-22 PROCEDURE — 99309 SBSQ NF CARE MODERATE MDM 30: CPT | Performed by: FAMILY MEDICINE

## 2024-04-22 NOTE — PROGRESS NOTES
Huber Lindsey is a 73 y.o. male with Chief Complaint of SNF ([facility]) H&P    Resident seen 2024 -- MELODY    CC: SNF ([facility] H&P    : 1950  SNF H&P done 24  DC Summary dated 24 reviewed 24  Allergy: Naproxen  Full Code    S: 72 yo man with stable PD, HTN, DM, Cirrhosis d/t hep C (Treated), depression admitted to SNF rehab after hospitalization for metabolic encephalopathy d/t UROSEPSIS. No cp, sob. Med List & Problem list reviewed.    O: VSS AFEB. 254# (24) Awake, Alert, NAD, oriented with mild baseline confusion. Chest cta. Heart rrr. Ext no c/c/e.    LAB (24) K 4.4, Cr 1.67, Na 138, GFR 43    A/P:  # PD: sinemet. SNF PT/OT. F/U w [facility].  # HTN: lopressor 50 DAILY, nifedipine 30, Losartan 50, HCTZ. OFF chlorthalidone since fall 7/10/2020.  # DM: stable on Trulicity 1.5, Farxiga, ASA, ARB, Statin. Off lantus.  # Edema: Off Lasix. Previous dry weight 256  # Gout: allopurinol 100 mg daily  # GERD: Pepcid.  # Hx UROSEPSIS: resolved.  .    Past Surgical History:   Procedure Laterality Date    MR HEAD ANGIO WO IV CONTRAST  2013    MR HEAD ANGIO WO IV CONTRAST 2013 [facility]ICAL LEGAC[facility]    MR NECK ANGIO WO IV CONTRAST  2013    MR NECK ANGIO WO IV CONTRAST 2013 [facility] CLINICAL LEGACY    OTHER SURGICAL HISTORY  2018    Biopsy Of Liver    RETINAL DETACHMENT SURGERY  2018    Repair Of Retinal Detachment    US GUIDED NEEDLE LIVER BIOPSY  3/27/2018    US GUIDED NEEDLE LIVER BIOPSY 3/27/2018 [facility]      Social History     Socioeconomic History    Marital status: Single     Spouse name: Not on file    Number of children: Not on file    Years of education: Not on file    Highest education level: Not on file   Occupational History    Not on file   Tobacco Use    Smoking status: Former     Current packs/day: 0.00     Types: Cigarettes     Quit date:      Years since quittin.3    Smokeless tobacco: Never   Substance and Sexual Activity     Alcohol use: Not Currently    Drug use: Not Currently    Sexual activity: Not on file   Other Topics Concern    Not on file   Social History Narrative    Not on file     Social Determinants of Health     Financial Resource Strain: Not on file   Food Insecurity: No Food Insecurity (4/9/2024)    Received from [facility]    Hunger Vital Sign     Worried About Running Out of Food in the Last Year: Never true     Ran Out of Food in the Last Year: Never true   Transportation Needs: No Transportation Needs (4/9/2024)    Received from [facility]    PRAPARE - Transportation     Lack of Transportation (Medical): No     Lack of Transportation (Non-Medical): No   Physical Activity: Not on file   Stress: Not on file   Social Connections: Not on file   Intimate Partner Violence: Not on file   Housing Stability: High Risk (4/9/2024)    Received from [facility]    Housing Stability Vital Sign     Unable to Pay for Housing in the Last Year: Yes     Number of Places Lived in the Last Year: Not on file     Unstable Housing in the Last Year: Yes     Past Medical History:   Diagnosis Date    Age-related nuclear cataract, left eye 02/19/2019    Age-related nuclear cataract of left eye    Age-related nuclear cataract, right eye 02/19/2019    Age-related nuclear cataract of right eye    Chronic viral hepatitis C (Multi) 03/02/2022    Chronic hepatitis C    Cortical age-related cataract, right eye 02/19/2019    Cortical age-related cataract of right eye    Encounter for follow-up examination after completed treatment for conditions other than malignant neoplasm 12/17/2019    Hospital discharge follow-up    Essential (primary) hypertension     Benign essential hypertension    Essential (primary) hypertension 03/02/2022    Essential hypertension    Parkinson's disease (Multi) 05/10/2022    Parkinsons disease    Personal history of other diseases of the circulatory system 12/04/2019    History of coronary artery disease     Personal history of other diseases of the nervous system and sense organs 01/15/2020    History of peripheral neuropathy    Personal history of other endocrine, nutritional and metabolic disease 10/11/2022    History of hyperkalemia    Personal history of other specified conditions 01/15/2020    History of tachycardia    Personal history of urinary (tract) infections 01/15/2020    History of urinary tract infection    Polyosteoarthritis, unspecified 01/18/2018    Generalized osteoarthrosis, involving multiple sites    Posterior subcapsular polar age-related cataract, right eye 02/19/2019    Posterior subcapsular polar age-related cataract of right eye    Type 2 diabetes mellitus with mild nonproliferative diabetic retinopathy without macular edema, unspecified eye (Multi) 02/19/2019    NPDR (nonproliferative diabetic retinopathy)    Type 2 diabetes mellitus without complications (Multi) 02/19/2019    Diabetes mellitus    Type 2 diabetes mellitus without complications (Multi) 09/11/2021    Type 2 diabetes mellitus    Unspecified cirrhosis of liver (Multi) 08/25/2022    Liver cirrhosis    Unspecified visual loss 01/18/2018    Vision impairment      Family History   Problem Relation Name Age of Onset    Other (cardiac disorder) Mother      Diabetes type II Mother      Other (cardiac disorder) Father          Review of Systems   Constitutional:  Negative for chills, fatigue and fever.   HENT:  Negative for rhinorrhea and sore throat.    Eyes:  Negative for pain and redness.   Respiratory:  Negative for cough and shortness of breath.    Cardiovascular:  Negative for chest pain and palpitations.   Gastrointestinal:  Negative for abdominal pain and blood in stool.   Endocrine: Negative for polydipsia and polyuria.   Genitourinary:  Negative for dysuria and hematuria.   Musculoskeletal:  Negative for back pain and neck stiffness.   Skin:  Negative for rash and wound.   Allergic/Immunologic: Negative for environmental  allergies and food allergies.   Neurological:  Positive for weakness. Negative for headaches.   Hematological:  Negative for adenopathy. Does not bruise/bleed easily.   Psychiatric/Behavioral:  Negative for hallucinations and suicidal ideas.       There were no vitals taken for this visit.  Physical Exam  Vitals reviewed.   Constitutional:       General: He is not in acute distress.     Appearance: He is not ill-appearing.   HENT:      Head: Normocephalic and atraumatic.      Right Ear: Tympanic membrane normal.      Left Ear: Tympanic membrane normal.      Nose: No congestion or rhinorrhea.      Mouth/Throat:      Pharynx: No oropharyngeal exudate or posterior oropharyngeal erythema.   Eyes:      Extraocular Movements: Extraocular movements intact.      Conjunctiva/sclera: Conjunctivae normal.      Pupils: Pupils are equal, round, and reactive to light.   Cardiovascular:      Rate and Rhythm: Normal rate and regular rhythm.      Heart sounds: No murmur heard.     No friction rub. No gallop.   Pulmonary:      Effort: Pulmonary effort is normal.      Breath sounds: Normal breath sounds. No wheezing, rhonchi or rales.   Abdominal:      General: There is no distension.      Palpations: Abdomen is soft.      Tenderness: There is no abdominal tenderness. There is no guarding or rebound.   Musculoskeletal:         General: No swelling or deformity.      Cervical back: Normal range of motion and neck supple.      Right lower leg: No edema.      Left lower leg: No edema.   Skin:     Capillary Refill: Capillary refill takes less than 2 seconds.      Coloration: Skin is not jaundiced.      Findings: No rash.   Neurological:      General: No focal deficit present.      Mental Status: He is alert.      Motor: Weakness present.   Psychiatric:         Mood and Affect: Mood normal.         Behavior: Behavior normal.       Lab Results   Component Value Date    WBC 12.6 (H) 02/01/2024    HGB 14.7 02/01/2024    HCT 43.7 02/01/2024     "MCV 91 02/01/2024     02/01/2024     Lab Results   Component Value Date    CHOL 112 04/29/2023    CHOL 142 02/23/2022    CHOL 173 02/22/2021     Lab Results   Component Value Date    HDL 40.1 04/29/2023    HDL 33.9 (A) 02/23/2022    HDL 50.1 02/22/2021     No results found for: \"LDLCALC\"  Lab Results   Component Value Date    TRIG 150 (H) 04/29/2023    TRIG 254 (H) 02/23/2022    TRIG 161 (H) 02/22/2021     No components found for: \"CHOLHDL\"  Lab Results   Component Value Date    HGBA1C 6.8 (H) 04/17/2024       Assessment/Plan   Problem List Items Addressed This Visit       HTN (hypertension)    Overview     Formatting of this note might be different from the original. Overview: Continue Amlodipine and lisinopril Hold HCTZ         Type 2 diabetes mellitus (Multi)    Overview     Formatting of this note might be different from the original. Overview: On metformin at home HbA1C 6.5 BG elevated due to being on steroids. Plan: Hold metformin, SSI Formatting of this note might be different from the original. On metformin at home HbA1C 6.5 BG elevated due to being on steroids. Plan: Hold metformin, SSI         Parkinsons disease (Multi)       "

## 2024-04-22 NOTE — LETTER
Patient: Huber Lindsey  : 1950    Encounter Date: 2024    Resident seen 2024 -- MP    CC: [facility] ([facility]) recheck    : 1950  SNF H&P done 24  DC Summary dated 24 reviewed 24  Allergy: Naproxen  Full Code    S: 74 yo man with stable PD, HTN, DM, Cirrhosis d/t hep C (Treated), depression and metabolic encephalopathy d/t recent UROSEPSIS. No cp, sob. Med List & Problem list reviewed.    24 D/W Sister/POA: agrees to stay with [facility]. Will monitor for signs of recurrent UTI.    O: Hypotensive, AFEB. 261# (up 7#) Awake, Alert, NAD, oriented with mild baseline confusion. Chest cta. Heart rrr. Ext + Edema.    LAB (24) Hgb 15.9, WBC 14.8, Alb 3.9, Cr 1.3, GFR 59  (24) A1c 6.8%    A/P:  # PD: sinemet. SNF PT/OT. F/U w [facility].  # HTN: lopressor 50 DAILY, HOLD nifedipine 30 for SBP under 105 as of 24, Losartan 50, HCTZ. OFF chlorthalidone since fall 7/10/2020.  # DM: stable on Trulicity 1.5, Farxiga, ASA, ARB, Statin. Monitor for s/s UTI.  # Edema: Torsemide 20 mg daily started 24 -- Previous dry weight 256. Daily weights.  # Gout: allopurinol 100 mg daily  # GERD: Pepcid.  # Hx UROSEPSIS: resolved.      Electronically Signed By: Junior Beverly MD   24  3:37 PM

## 2024-04-25 ENCOUNTER — NURSING HOME VISIT (OUTPATIENT)
Dept: POST ACUTE CARE | Facility: EXTERNAL LOCATION | Age: 74
End: 2024-04-25
Payer: COMMERCIAL

## 2024-04-25 DIAGNOSIS — K21.9 GASTROESOPHAGEAL REFLUX DISEASE, UNSPECIFIED WHETHER ESOPHAGITIS PRESENT: ICD-10-CM

## 2024-04-25 DIAGNOSIS — I10 PRIMARY HYPERTENSION: ICD-10-CM

## 2024-04-25 DIAGNOSIS — E11.9 TYPE 2 DIABETES MELLITUS WITHOUT COMPLICATION, UNSPECIFIED WHETHER LONG TERM INSULIN USE (MULTI): ICD-10-CM

## 2024-04-25 DIAGNOSIS — M62.81 MUSCLE WEAKNESS (GENERALIZED): Primary | ICD-10-CM

## 2024-04-25 DIAGNOSIS — N30.00 ACUTE CYSTITIS WITHOUT HEMATURIA: ICD-10-CM

## 2024-04-25 DIAGNOSIS — G47.00 INSOMNIA, UNSPECIFIED TYPE: ICD-10-CM

## 2024-04-25 DIAGNOSIS — J98.01 BRONCHOSPASM: ICD-10-CM

## 2024-04-25 DIAGNOSIS — M10.9 GOUT, UNSPECIFIED CAUSE, UNSPECIFIED CHRONICITY, UNSPECIFIED SITE: ICD-10-CM

## 2024-04-25 DIAGNOSIS — J31.0 RHINITIS, UNSPECIFIED TYPE: ICD-10-CM

## 2024-04-25 DIAGNOSIS — G20.A1 PARKINSON'S DISEASE, UNSPECIFIED WHETHER DYSKINESIA PRESENT, UNSPECIFIED WHETHER MANIFESTATIONS FLUCTUATE (MULTI): ICD-10-CM

## 2024-04-25 DIAGNOSIS — E78.5 DYSLIPIDEMIA: ICD-10-CM

## 2024-04-25 PROCEDURE — 99309 SBSQ NF CARE MODERATE MDM 30: CPT | Performed by: NURSE PRACTITIONER

## 2024-04-26 NOTE — PROGRESS NOTES
*Provider Impression*    Patient is a 73 year old male who is seen today for management of multiple medical problems       #Weakness / Parkinson's - PT/OT, sinemet 25/100mg 3 tabs TID, acetaminophen 650mg q4h PRN  #Acute cystitis - completed cipro  #HTN / HLD - ASA 81mg daily, metoprolol 50mg daily, atorvastatin 20mg daily, losartan 50mg daily, nifedipine ER 30mg daily  #T2DM - Trulicity 1.5mg weekly, Farxiga 10mg daily  #Rhinitis - saline spray q4h PRN  #GERD - pepcid 20mg BID PRN  #Gout - allopurinol 100mg daily  #Bronchospasm - duoneb q6h PRN  #Insomnia - melatonin 3mg QHS  #Vitamin deficiency - centrum daily  #ACP - Full Code  Follow up as needed      *Chief Complaint*     acute cystitis    *History of Present Illness*    Patient is a 74 y/o male w/ PMH as below who presented to the ED w/ dysuria and confusion. He was admitted for sepsis 2/2 UTI / cystitis. Treated w/ vancomycin and zosyn. His chronic medical conditions were managed, including chronic right gluteal pressure wound. He was seen by PT/OT who recommended SNF so he was d/c to [facility] on 4/12.    He is seen sitting up in his room today and denies any f/c, sweats, dysuria, CP, SOB, cough, n/v, constipation, diarrhea, no new edema, no tremor, or any other new c/o presently.     Allergies - Naproxen  PMH - HTN, HLD, CAD, T2DM, Obesity, HCV, cirrhosis, NAFLD, CKD, Gout, OA, Parkinson's  PSH - Liver biopsy, retinal detachment repair  FH - Mother had heart disease, T2DM; Father had heart disease  SocHx - Former smoker, No EtOH    *Review of Systems*  All other systems reviewed are negative except as noted in the HPI     *Vital Signs*   Date: 4/19/24  - T: 97.9  P: 82  R: 16  BP: 136/84  SpO2: 95% on RA     *Results / Data*  CBC - Date: 4/17/24  WBC: 11.7  HGB: 14.0  HCT: 43.5  PLT: 288  ;   BMP - Date: 4/15/24  Na: 144  K: 4.0  Cl: 105  CO2: 23  BUN: 28  Cr: 1.09  Glu: 157  Ca: 9.3  ;   LFT - Date: 4/15/24  AST: 23  ALT: <5  ALP: 97  Tbili: 0.5  ;    Coags - Date:   INR:   PT:    *Physical Exam*  Gen: (+) NAD, (+) well-appearing  HEENT: (+) normocephalic, (+) MMM  Neck: (+) supple  Lungs: (+) CTAB, (-) wheezes, (-) rales, (-) rhonchi  Heart: (+) RRR, (+) S1 S2, (-) murmurs  Pulses: (+) palpable  Abd: (+) soft, (+) NT, (+) ND, (+) BS+  Ext: (-) edema, (-) deformity  MSK: (-) joint swelling  Skin: (+) warm, (+) dry, (-) rash  Neuro: (+) follows commands, (-) tremor, (+) alert

## 2024-04-29 ENCOUNTER — NURSING HOME VISIT (OUTPATIENT)
Dept: POST ACUTE CARE | Facility: EXTERNAL LOCATION | Age: 74
End: 2024-04-29
Payer: COMMERCIAL

## 2024-04-29 DIAGNOSIS — G20.A1 PARKINSON'S DISEASE, UNSPECIFIED WHETHER DYSKINESIA PRESENT, UNSPECIFIED WHETHER MANIFESTATIONS FLUCTUATE (MULTI): ICD-10-CM

## 2024-04-29 DIAGNOSIS — I10 HYPERTENSION, ESSENTIAL, BENIGN: ICD-10-CM

## 2024-04-29 PROBLEM — E11.29 TYPE II OR UNSPECIFIED TYPE DIABETES MELLITUS WITH RENAL MANIFESTATIONS, UNCONTROLLED(250.42) (MULTI): Status: ACTIVE | Noted: 2024-04-29

## 2024-04-29 PROBLEM — E11.65 TYPE II OR UNSPECIFIED TYPE DIABETES MELLITUS WITH RENAL MANIFESTATIONS, UNCONTROLLED(250.42) (MULTI): Status: ACTIVE | Noted: 2024-04-29

## 2024-04-29 PROCEDURE — 99308 SBSQ NF CARE LOW MDM 20: CPT | Performed by: FAMILY MEDICINE

## 2024-04-29 NOTE — PROGRESS NOTES
Resident seen 2024 -- MP    CC: [facility] ([facility]) recheck    : 1950  SNF H&P done 24  DC Summary dated 24 reviewed 24  Allergy: Naproxen  Full Code    S: 74 yo man with stable PD, HTN, DM, Cirrhosis d/t hep C (Treated), depression and metabolic encephalopathy d/t recent UROSEPSIS. No cp, sob. Med List & Problem list reviewed.    24 D/W Sister/POA: agrees to stay with [facility]. Will monitor for signs of recurrent UTI.    O: VSS AFEB. 260# (down 1#, Dry Wt 256#) Awake, Alert, NAD, oriented with mild baseline confusion. Chest cta. Heart rrr. Ext 1+ Edema.    LAB (24) Hgb 15.9, WBC 14.8, Alb 3.9, Cr 1.3, GFR 59  (24) A1c 6.8%    A/P:  # PD: sinemet. SNF PT/OT. F/U w Neuro [facility].  # HTN: lopressor 50 DAILY, HOLD nifedipine 30 for SBP under 105 as of 24, Losartan 50, HCTZ. OFF chlorthalidone since fall 7/10/2020.  # DM: stable on Trulicity 1.5, Farxiga, ASA, ARB, Statin. Monitor for s/s UTI.  # Edema: Torsemide 20 mg daily started 24 -- Previous dry weight 256. Daily weights.  # Gout: allopurinol 100 mg daily  # GERD: Pepcid.  # Hx UROSEPSIS: resolved.

## 2024-04-29 NOTE — LETTER
Patient: Huber Lindsey  : 1950    Encounter Date: 2024    Resident seen 2024 -- MP    CC: [facility] ([facility]) recheck    : 1950  SNF H&P done 24  DC Summary dated 24 reviewed 24  Allergy: Naproxen  Full Code    S: 72 yo man with stable PD, HTN, DM, Cirrhosis d/t hep C (Treated), depression and metabolic encephalopathy d/t recent UROSEPSIS. No cp, sob. Med List & Problem list reviewed.    24 D/W Sister/POA: agrees to stay with [facility]. Will monitor for signs of recurrent UTI.    O: VSS AFEB. 260# (down 1#, Dry Wt 256#) Awake, Alert, NAD, oriented with mild baseline confusion. Chest cta. Heart rrr. Ext 1+ Edema.    LAB (24) Hgb 15.9, WBC 14.8, Alb 3.9, Cr 1.3, GFR 59  (24) A1c 6.8%    A/P:  # PD: sinemet. SNF PT/OT. F/U w Neuro [facility].  # HTN: lopressor 50 DAILY, HOLD nifedipine 30 for SBP under 105 as of 24, Losartan 50, HCTZ. OFF chlorthalidone since fall 7/10/2020.  # DM: stable on Trulicity 1.5, Farxiga, ASA, ARB, Statin. Monitor for s/s UTI.  # Edema: Torsemide 20 mg daily started 24 -- Previous dry weight 256. Daily weights.  # Gout: allopurinol 100 mg daily  # GERD: Pepcid.  # Hx UROSEPSIS: resolved.      Electronically Signed By: Junior Beverly MD   24  3:37 PM

## 2024-04-29 NOTE — PROGRESS NOTES
Resident seen 2024 -- MP    CC: [facility] ([facility]) recheck    : 1950  SNF H&P done 24  DC Summary dated 24 reviewed 24  Allergy: Naproxen  Full Code    S: 74 yo man with stable PD, HTN, DM, Cirrhosis d/t hep C (Treated), depression and metabolic encephalopathy d/t recent UROSEPSIS. No cp, sob. Med List & Problem list reviewed.    24 D/W Sister/POA: agrees to stay with [facility]. Will monitor for signs of recurrent UTI.    O: Hypotensive, AFEB. 261# (up 7#) Awake, Alert, NAD, oriented with mild baseline confusion. Chest cta. Heart rrr. Ext + Edema.    LAB (24) Hgb 15.9, WBC 14.8, Alb 3.9, Cr 1.3, GFR 59  (24) A1c 6.8%    A/P:  # PD: sinemet. SNF PT/OT. F/U w Neuro [facility].  # HTN: lopressor 50 DAILY, HOLD nifedipine 30 for SBP under 105 as of 24, Losartan 50, HCTZ. OFF chlorthalidone since fall 7/10/2020.  # DM: stable on Trulicity 1.5, Farxiga, ASA, ARB, Statin. Monitor for s/s UTI.  # Edema: Torsemide 20 mg daily started 24 -- Previous dry weight 256. Daily weights.  # Gout: allopurinol 100 mg daily  # GERD: Pepcid.  # Hx UROSEPSIS: resolved.

## 2024-04-30 ASSESSMENT — ENCOUNTER SYMPTOMS
RHINORRHEA: 0
COUGH: 0
SHORTNESS OF BREATH: 0
CHILLS: 0
BLOOD IN STOOL: 0
SORE THROAT: 0
DYSURIA: 0
EYE REDNESS: 0
ADENOPATHY: 0
BACK PAIN: 0
FATIGUE: 0
POLYDIPSIA: 0
HALLUCINATIONS: 0
HEADACHES: 0
EYE PAIN: 0
ABDOMINAL PAIN: 0
PALPITATIONS: 0
WOUND: 0
FEVER: 0
WEAKNESS: 1
BRUISES/BLEEDS EASILY: 0
NECK STIFFNESS: 0
HEMATURIA: 0

## 2024-05-02 ENCOUNTER — NURSING HOME VISIT (OUTPATIENT)
Dept: POST ACUTE CARE | Facility: EXTERNAL LOCATION | Age: 74
End: 2024-05-02
Payer: COMMERCIAL

## 2024-05-02 DIAGNOSIS — J98.01 BRONCHOSPASM: ICD-10-CM

## 2024-05-02 DIAGNOSIS — E11.9 TYPE 2 DIABETES MELLITUS WITHOUT COMPLICATION, UNSPECIFIED WHETHER LONG TERM INSULIN USE (MULTI): ICD-10-CM

## 2024-05-02 DIAGNOSIS — M62.81 MUSCLE WEAKNESS (GENERALIZED): Primary | ICD-10-CM

## 2024-05-02 DIAGNOSIS — K21.9 GASTROESOPHAGEAL REFLUX DISEASE, UNSPECIFIED WHETHER ESOPHAGITIS PRESENT: ICD-10-CM

## 2024-05-02 DIAGNOSIS — N30.00 ACUTE CYSTITIS WITHOUT HEMATURIA: ICD-10-CM

## 2024-05-02 DIAGNOSIS — J31.0 RHINITIS, UNSPECIFIED TYPE: ICD-10-CM

## 2024-05-02 DIAGNOSIS — E78.5 DYSLIPIDEMIA: ICD-10-CM

## 2024-05-02 DIAGNOSIS — G47.00 INSOMNIA, UNSPECIFIED TYPE: ICD-10-CM

## 2024-05-02 DIAGNOSIS — I10 HYPERTENSION, ESSENTIAL, BENIGN: ICD-10-CM

## 2024-05-02 DIAGNOSIS — M10.9 GOUT, UNSPECIFIED CAUSE, UNSPECIFIED CHRONICITY, UNSPECIFIED SITE: ICD-10-CM

## 2024-05-02 DIAGNOSIS — G20.A1 PARKINSON'S DISEASE, UNSPECIFIED WHETHER DYSKINESIA PRESENT, UNSPECIFIED WHETHER MANIFESTATIONS FLUCTUATE (MULTI): ICD-10-CM

## 2024-05-02 PROCEDURE — 99316 NF DSCHRG MGMT 30 MIN+: CPT | Performed by: NURSE PRACTITIONER

## 2024-05-02 NOTE — PROGRESS NOTES
*Provider Impression*    Patient is a 73 year old male who is seen today for management of multiple medical problems       #Weakness / Parkinson's - PT/OT, sinemet 25/100mg 3 tabs TID, acetaminophen 650mg q4h PRN  #Acute cystitis - completed cipro  #HTN / HLD - ASA 81mg daily, atorvastatin 20mg daily, losartan 50mg daily, nifedipine ER 30mg daily, torsemide 20mg daily, increase metoprolol 50mg BID - hold  for SBP<100 or HR<60, CBC w/ diff, CMP in am  #T2DM - increase Trulicity 3mg weekly, Farxiga 10mg daily  #Rhinitis - saline spray q4h PRN  #GERD - pepcid 20mg BID PRN  #Gout - allopurinol 100mg daily  #Bronchospasm - duoneb q6h PRN  #Insomnia - melatonin 3mg QHS  #Vitamin deficiency - centrum daily  #ACP - Full Code  Follow up as needed      *Chief Complaint*     discharge planning     *History of Present Illness*    Patient is a 72 y/o male w/ PMH as below who presented to the ED w/ dysuria and confusion. He was admitted for sepsis 2/2 UTI / cystitis. Treated w/ vancomycin and zosyn. His chronic medical conditions were managed, including chronic right gluteal pressure wound. He was seen by PT/OT who recommended SNF so he was d/c to [facility] on 4/12.    He was started on torsemide. Nursing staff concerned about increased HR and BP as well as glucose readings.     He is seen sitting up in his room today and denies any f/c, sweats, CP, SOB, some dry cough, n/v, diarrhea, constipation, LUTS, edema, rashes, or any other new c/o presently.     Discussed a variety of hypoglycemic options and ultimately pt and family in agreement w/ increasing trulicity.    (Addendum: per therapy evaluation, he is in need of a bedside commode which is indicated for confinement w/ limited to no access to toilet facilities).    Allergies - Naproxen  PMH - HTN, HLD, CAD, T2DM, Obesity, HCV, cirrhosis, NAFLD, CKD, Gout, OA, Parkinson's  PSH - Liver biopsy, retinal detachment repair  FH - Mother had heart disease, T2DM; Father had heart  disease  SocHx - Former smoker, No EtOH    *Review of Systems*  All other systems reviewed are negative except as noted in the HPI     *Vital Signs*   Date: 4/25/24  - T: 97.9  P: 108  R: 18  BP: 140/70  SpO2: 96% on RA     *Results / Data*  CBC - Date: 4/22/24  WBC: 14.82  HGB: 15.9  HCT: 47.8  PLT: 373  ;   BMP - Date: 4/22/24  Na: 140  K: 4.0  Cl: 98  CO2: 22  BUN: 43  Cr: 1.30  Glu: 311  Ca: 9.1  ;   LFT - Date: 4/22/24  AST: 17  ALT: 10  ALP: 121  Tbili: 0.8  ;   Coags - Date:   INR:   PT:    *Physical Exam*  Gen: (+) NAD, (+) well-appearing  HEENT: (+) normocephalic, (+) MMM  Neck: (+) supple  Lungs: (+) CTAB, (-) wheezes, (-) rales, (-) rhonchi  Heart: (+) RRR, (+) S1 S2, (-) murmurs  Pulses: (+) palpable  Abd: (+) soft, (+) NT, (+) ND, (+) BS+  Ext: (-) edema, (-) deformity  MSK: (-) joint swelling  Skin: (+) warm, (+) dry, (-) rash  Neuro: (+) follows commands, (-) tremor, (+) alert

## 2024-05-07 NOTE — PROGRESS NOTES
*Provider Impression*    Patient is a 73 year old male who is seen today for management of multiple medical problems       #Weakness / Parkinson's - d/c to home with home health PT/OT, sinemet 25/100mg 3 tabs TID, acetaminophen 650mg q4h PRN  #Acute cystitis - completed cipro  #HTN / HLD - ASA 81mg daily, atorvastatin 20mg daily, losartan 50mg daily, nifedipine ER 30mg daily, torsemide 20mg daily, increase metoprolol 50mg BID - hold  for SBP<100 or HR<60, CBC w/ diff, CMP in am  #T2DM - Trulicity 3mg weekly, Farxiga 10mg daily  #Rhinitis - saline spray q4h PRN  #GERD - pepcid 20mg BID PRN  #Gout - allopurinol 100mg daily  #Bronchospasm - duoneb q6h PRN  #Insomnia - melatonin 3mg QHS  #Vitamin deficiency - centrum daily  #ACP - Full Code  Follow up w/ PCP  Time spent: >30 minutes, greater than 50% of which spent in counseling and/or coordination of care).     *Chief Complaint*     discharge planning     *History of Present Illness*    Patient is a 74 y/o male w/ PMH as below who presented to the ED w/ dysuria and confusion. He was admitted for sepsis 2/2 UTI / cystitis. Treated w/ vancomycin and zosyn. His chronic medical conditions were managed, including chronic right gluteal pressure wound. He was seen by PT/OT who recommended SNF so he was d/c to [facility] on 4/12.     He is discharging to home.     He is seen sitting up in his room today and denies any pain, dysuria, f/c, n/v, constipation, CP, SOB, cough, or any other new c/o presently.    Med rec done and meds sent electronically to [facility]     Allergies - Naproxen  PMH - HTN, HLD, CAD, T2DM, Obesity, HCV, cirrhosis, NAFLD, CKD, Gout, OA, Parkinson's  PSH - Liver biopsy, retinal detachment repair  FH - Mother had heart disease, T2DM; Father had heart disease  SocHx - Former smoker, No EtOH     *Review of Systems*  All other systems reviewed are negative except as noted in the HPI      *Vital Signs*   Date: 5/2/24  - T: 97.5  P: 81  R: 18  BP: 125/74  SpO2:  96% on RA      *Results / Data*  CBC - Date: 4/22/24  WBC: 14.82  HGB: 15.9  HCT: 47.8  PLT: 373  ;   BMP - Date: 4/22/24  Na: 140  K: 4.0  Cl: 98  CO2: 22  BUN: 43  Cr: 1.30  Glu: 311  Ca: 9.1  ;   LFT - Date: 4/22/24  AST: 17  ALT: 10  ALP: 121  Tbili: 0.8  ;   Coags - Date:   INR:   PT:     *Physical Exam*  Gen: (+) NAD, (+) well-appearing  HEENT: (+) normocephalic, (+) MMM  Neck: (+) supple  Lungs: (+) CTAB, (-) wheezes, (-) rales, (-) rhonchi  Heart: (+) RRR, (+) S1 S2, (-) murmurs  Pulses: (+) palpable  Abd: (+) soft, (+) NT, (+) ND, (+) BS+  Ext: (-) edema, (-) deformity  MSK: (-) joint swelling  Skin: (+) warm, (+) dry, (-) rash  Neuro: (+) follows commands, (-) tremor, (+) alert

## 2024-05-20 ENCOUNTER — LAB (OUTPATIENT)
Dept: LAB | Facility: LAB | Age: 74
End: 2024-05-20
Payer: COMMERCIAL

## 2024-05-20 ENCOUNTER — PATIENT OUTREACH (OUTPATIENT)
Dept: CARE COORDINATION | Facility: CLINIC | Age: 74
End: 2024-05-20
Payer: COMMERCIAL

## 2024-05-20 DIAGNOSIS — N18.32 TYPE 2 DIABETES MELLITUS WITH STAGE 3B CHRONIC KIDNEY DISEASE, WITHOUT LONG-TERM CURRENT USE OF INSULIN (MULTI): ICD-10-CM

## 2024-05-20 DIAGNOSIS — E11.22 TYPE 2 DIABETES MELLITUS WITH STAGE 3B CHRONIC KIDNEY DISEASE, WITHOUT LONG-TERM CURRENT USE OF INSULIN (MULTI): ICD-10-CM

## 2024-05-20 DIAGNOSIS — I10 PRIMARY HYPERTENSION: ICD-10-CM

## 2024-05-20 DIAGNOSIS — N18.31 STAGE 3A CHRONIC KIDNEY DISEASE (MULTI): Primary | ICD-10-CM

## 2024-05-20 LAB
ALBUMIN SERPL BCP-MCNC: 4.4 G/DL (ref 3.4–5)
ALP SERPL-CCNC: 84 U/L (ref 33–136)
ALT SERPL W P-5'-P-CCNC: 8 U/L (ref 10–52)
ANION GAP SERPL CALC-SCNC: 18 MMOL/L
AST SERPL W P-5'-P-CCNC: 22 U/L (ref 9–39)
BILIRUB SERPL-MCNC: 1.4 MG/DL (ref 0–1.2)
BUN SERPL-MCNC: 38 MG/DL (ref 6–23)
CALCIUM SERPL-MCNC: 9.5 MG/DL (ref 8.6–10.6)
CHLORIDE SERPL-SCNC: 103 MMOL/L (ref 98–107)
CHOLEST SERPL-MCNC: 115 MG/DL (ref 0–199)
CHOLESTEROL/HDL RATIO: 3.1
CO2 SERPL-SCNC: 26 MMOL/L (ref 21–32)
CREAT SERPL-MCNC: 1.51 MG/DL (ref 0.5–1.3)
EGFRCR SERPLBLD CKD-EPI 2021: 49 ML/MIN/1.73M*2
ERYTHROCYTE [DISTWIDTH] IN BLOOD BY AUTOMATED COUNT: 14.2 % (ref 11.5–14.5)
EST. AVERAGE GLUCOSE BLD GHB EST-MCNC: 163 MG/DL
GLUCOSE SERPL-MCNC: 192 MG/DL (ref 74–99)
HBA1C MFR BLD: 7.3 %
HCT VFR BLD AUTO: 45 % (ref 41–52)
HDLC SERPL-MCNC: 37 MG/DL
HGB BLD-MCNC: 14.6 G/DL (ref 13.5–17.5)
LDLC SERPL CALC-MCNC: 34 MG/DL
MCH RBC QN AUTO: 30 PG (ref 26–34)
MCHC RBC AUTO-ENTMCNC: 32.4 G/DL (ref 32–36)
MCV RBC AUTO: 93 FL (ref 80–100)
NON HDL CHOLESTEROL: 78 MG/DL (ref 0–149)
NRBC BLD-RTO: 0 /100 WBCS (ref 0–0)
PLATELET # BLD AUTO: 258 X10*3/UL (ref 150–450)
POTASSIUM SERPL-SCNC: 4.5 MMOL/L (ref 3.5–5.3)
PROT SERPL-MCNC: 7.9 G/DL (ref 6.4–8.2)
RBC # BLD AUTO: 4.86 X10*6/UL (ref 4.5–5.9)
SODIUM SERPL-SCNC: 142 MMOL/L (ref 136–145)
TRIGL SERPL-MCNC: 222 MG/DL (ref 0–149)
TSH SERPL-ACNC: 1.86 MIU/L (ref 0.44–3.98)
VLDL: 44 MG/DL (ref 0–40)
WBC # BLD AUTO: 13.7 X10*3/UL (ref 4.4–11.3)

## 2024-05-20 PROCEDURE — 85027 COMPLETE CBC AUTOMATED: CPT

## 2024-05-20 PROCEDURE — 83036 HEMOGLOBIN GLYCOSYLATED A1C: CPT

## 2024-05-20 PROCEDURE — 80061 LIPID PANEL: CPT

## 2024-05-20 PROCEDURE — 84443 ASSAY THYROID STIM HORMONE: CPT

## 2024-05-20 PROCEDURE — 80053 COMPREHEN METABOLIC PANEL: CPT

## 2024-05-20 PROCEDURE — 36415 COLL VENOUS BLD VENIPUNCTURE: CPT

## 2024-05-24 ENCOUNTER — OFFICE VISIT (OUTPATIENT)
Dept: PRIMARY CARE | Facility: CLINIC | Age: 74
End: 2024-05-24
Payer: COMMERCIAL

## 2024-05-24 VITALS
SYSTOLIC BLOOD PRESSURE: 112 MMHG | BODY MASS INDEX: 33.62 KG/M2 | TEMPERATURE: 97.7 F | HEIGHT: 74 IN | HEART RATE: 94 BPM | DIASTOLIC BLOOD PRESSURE: 69 MMHG | WEIGHT: 262 LBS | OXYGEN SATURATION: 95 %

## 2024-05-24 DIAGNOSIS — I10 PRIMARY HYPERTENSION: ICD-10-CM

## 2024-05-24 DIAGNOSIS — K74.69 OTHER CIRRHOSIS OF LIVER (MULTI): ICD-10-CM

## 2024-05-24 DIAGNOSIS — Z09 HOSPITAL DISCHARGE FOLLOW-UP: Primary | ICD-10-CM

## 2024-05-24 DIAGNOSIS — E11.51 TYPE 2 DIABETES MELLITUS WITH DIABETIC PERIPHERAL ANGIOPATHY WITHOUT GANGRENE, WITHOUT LONG-TERM CURRENT USE OF INSULIN (MULTI): ICD-10-CM

## 2024-05-24 PROCEDURE — 99214 OFFICE O/P EST MOD 30 MIN: CPT | Performed by: STUDENT IN AN ORGANIZED HEALTH CARE EDUCATION/TRAINING PROGRAM

## 2024-05-24 PROCEDURE — 3061F NEG MICROALBUMINURIA REV: CPT | Performed by: STUDENT IN AN ORGANIZED HEALTH CARE EDUCATION/TRAINING PROGRAM

## 2024-05-24 PROCEDURE — 3051F HG A1C>EQUAL 7.0%<8.0%: CPT | Performed by: STUDENT IN AN ORGANIZED HEALTH CARE EDUCATION/TRAINING PROGRAM

## 2024-05-24 PROCEDURE — 1159F MED LIST DOCD IN RCRD: CPT | Performed by: STUDENT IN AN ORGANIZED HEALTH CARE EDUCATION/TRAINING PROGRAM

## 2024-05-24 PROCEDURE — 3048F LDL-C <100 MG/DL: CPT | Performed by: STUDENT IN AN ORGANIZED HEALTH CARE EDUCATION/TRAINING PROGRAM

## 2024-05-24 PROCEDURE — 3078F DIAST BP <80 MM HG: CPT | Performed by: STUDENT IN AN ORGANIZED HEALTH CARE EDUCATION/TRAINING PROGRAM

## 2024-05-24 PROCEDURE — 3074F SYST BP LT 130 MM HG: CPT | Performed by: STUDENT IN AN ORGANIZED HEALTH CARE EDUCATION/TRAINING PROGRAM

## 2024-05-24 PROCEDURE — 1036F TOBACCO NON-USER: CPT | Performed by: STUDENT IN AN ORGANIZED HEALTH CARE EDUCATION/TRAINING PROGRAM

## 2024-05-24 PROCEDURE — 1160F RVW MEDS BY RX/DR IN RCRD: CPT | Performed by: STUDENT IN AN ORGANIZED HEALTH CARE EDUCATION/TRAINING PROGRAM

## 2024-05-24 PROCEDURE — 4010F ACE/ARB THERAPY RXD/TAKEN: CPT | Performed by: STUDENT IN AN ORGANIZED HEALTH CARE EDUCATION/TRAINING PROGRAM

## 2024-05-24 RX ORDER — TORSEMIDE 20 MG/1
20 TABLET ORAL DAILY
COMMUNITY
Start: 2024-05-03

## 2024-05-24 RX ORDER — POTASSIUM CHLORIDE 20 MEQ/1
20 TABLET, EXTENDED RELEASE ORAL DAILY
COMMUNITY
Start: 2024-05-05

## 2024-05-24 RX ORDER — HYDROCHLOROTHIAZIDE 25 MG/1
25 TABLET ORAL DAILY
Qty: 90 TABLET | Refills: 1 | Status: SHIPPED | OUTPATIENT
Start: 2024-05-24

## 2024-05-24 ASSESSMENT — ENCOUNTER SYMPTOMS
OCCASIONAL FEELINGS OF UNSTEADINESS: 1
DEPRESSION: 0
LOSS OF SENSATION IN FEET: 0

## 2024-05-24 NOTE — PROGRESS NOTES
"Subjective   Patient ID: Huber Lindsey is a 73 y.o. male who presents for Follow-up.        HPI    Is here with his sis , who is the historian       Sis noticed AMS on 4/8 , was taken to the hospital , dced on 4/12, dced to snf , returned home on 5/3   [facility] for UTI  Huber Lindsey reports having sx prior to that but ignored them   He has been on Farxiga for many years now and this is his first UTI  Hospital course, labs and imaging reports reviewed . Med reconciliation done .   Hydrochlorothiazide  was dced, torsemide 20mg was started to help with LE edema   Trulicity was increased to 3mg   Potassium was added as well   Visit Vitals  /69 (BP Location: Right arm, Patient Position: Sitting, BP Cuff Size: Large adult)   Pulse 94   Temp 36.5 °C (97.7 °F) (Oral)   Ht 1.88 m (6' 2\")   Wt 119 kg (262 lb)   SpO2 95%   BMI 33.64 kg/m²   Smoking Status Former   BSA 2.49 m²      No LMP for male patient.   Current Outpatient Medications   Medication Instructions    acetaminophen (ACETAMINOPHEN EXTRA STRENGTH) 500 mg, oral, 2 times daily    allopurinol (ZYLOPRIM) 100 mg, oral, Daily    aspirin 81 mg, oral, Daily    atorvastatin (LIPITOR) 20 mg, oral, Daily    carbidopa-levodopa (Sinemet)  mg tablet TAKE 3 TABLETS BY MOUTH THREE TIMES DAILY    dapagliflozin propanediol (FARXIGA) 10 mg, oral, Every morning    famotidine (Pepcid) 20 mg tablet 1 tablet, oral, Nightly    fluticasone (Flonase) 50 mcg/actuation nasal spray nasal    hydroCHLOROthiazide (HYDRODIURIL) 25 mg, oral, Daily    losartan (COZAAR) 50 mg, oral, Daily    metoprolol tartrate (LOPRESSOR) 50 mg, oral, Every 12 hours    multivit-iron-minerals-folic acid (Centrum Silver) 0.4 mg-300 mcg- 250 mcg tab 1 tablet, oral, Daily    NIFEdipine ER (ADALAT CC) 30 mg, oral, Daily    potassium chloride CR 20 mEq ER tablet 20 mEq, oral, Daily    sodium zirconium cyclosilicate (Lokelma) 10 gram packet TAKE 1 PACKET ONCE DAILY    torsemide (DEMADEX) 20 mg, oral, Daily " no concerns    Trulicity 1.5 mg, subcutaneous, Once Weekly    Trulicity 0.75 mg, subcutaneous, Once Weekly      Social History     Tobacco Use    Smoking status: Former     Current packs/day: 0.00     Types: Cigarettes     Quit date:      Years since quittin.    Smokeless tobacco: Never   Substance Use Topics    Alcohol use: Not Currently        Review of Systems    Constitutional : No feeling poorly / fevers/ chills / night sweats/ fatigue   Cardiovascular : No CP /Palpitations/ lower extremity edema / syncope   Respiratory : No Cough /HELLER/Dyspnea at rest   Gastrointestinal : No abd pain / N/V  No bloody stools/ melena / constipation  Endo : No polyuria/polydipsia/ muscle weakness / sluggishness   CNS: No confusion / HA/ tingling/ numbness/ weakness of extremities  Psychiatric: No anxiety/ depression/ SI/HI    All other systems have been reviewed and are negative for complaint       Physical Exam    Constitutional : Vitals reviewed. Alert and in no distress  Cardiovascular : RRR, Normal S1, S2, No pericardial rub/ gallop, peripheral edema  above the ankles noted   Pulmonary: No respiratory distress, CTAB   MSK : Normal gait and station , strength and tone   Skin:  minimal erythema noted on the medial side of b/l  buttocks in the midline  Neurologic : CNs 2-12 grossly intact , no obvious FNDs  Psych : A,Ox3, normal mood and affect      Assessment/Plan   Diagnoses and all orders for this visit:  Primary hypertension  -     hydroCHLOROthiazide (HYDRODiuril) 25 mg tablet; Take 1 tablet (25 mg) by mouth once daily.  -     Basic Metabolic Panel; Future  Other cirrhosis of liver (Multi)  Type 2 diabetes mellitus with diabetic peripheral angiopathy without gangrene, without long-term current use of insulin (Multi)        Hospital course, labs and imaging reports reviewed . Med reconciliation done . F/u labs/Imaging, if needed were ordered .   Continue torsemide for another week and then transition to 25mg o  hydrochlorothiazide   Continue 1.5mg of Trulicity   May continue postassium supplements until I see him   Rpt BMP prior to the next visit in 6 weeks   Advised foot end elevation avoid sitting in the recliner for long hrs at a stretch             Conditions addressed and mgmt as noted above.  Pertinent labs, images/ imaging reports , chart review was done .   Age appropriate labs / labs for mgmt of chronic medical conditions ordered, further mgmt pending the results.

## 2024-06-14 ENCOUNTER — LAB (OUTPATIENT)
Dept: LAB | Facility: LAB | Age: 74
End: 2024-06-14
Payer: COMMERCIAL

## 2024-06-14 DIAGNOSIS — D64.9 ANEMIA, UNSPECIFIED TYPE: ICD-10-CM

## 2024-06-14 DIAGNOSIS — N18.32 STAGE 3B CHRONIC KIDNEY DISEASE (MULTI): ICD-10-CM

## 2024-06-14 DIAGNOSIS — I10 PRIMARY HYPERTENSION: ICD-10-CM

## 2024-06-14 LAB
25(OH)D3 SERPL-MCNC: 35 NG/ML (ref 30–100)
ALBUMIN SERPL BCP-MCNC: 4.3 G/DL (ref 3.4–5)
ANION GAP SERPL CALC-SCNC: 14 MMOL/L (ref 10–20)
APPEARANCE UR: CLEAR
BILIRUB UR STRIP.AUTO-MCNC: NEGATIVE MG/DL
BUN SERPL-MCNC: 48 MG/DL (ref 6–23)
CALCIUM SERPL-MCNC: 9.6 MG/DL (ref 8.6–10.6)
CHLORIDE SERPL-SCNC: 101 MMOL/L (ref 98–107)
CO2 SERPL-SCNC: 25 MMOL/L (ref 21–32)
COLOR UR: ABNORMAL
CREAT SERPL-MCNC: 1.28 MG/DL (ref 0.5–1.3)
CREAT UR-MCNC: 51.9 MG/DL (ref 20–370)
CREAT UR-MCNC: 51.9 MG/DL (ref 20–370)
EGFRCR SERPLBLD CKD-EPI 2021: 60 ML/MIN/1.73M*2
ERYTHROCYTE [DISTWIDTH] IN BLOOD BY AUTOMATED COUNT: 15.5 % (ref 11.5–14.5)
GLUCOSE SERPL-MCNC: 168 MG/DL (ref 74–99)
GLUCOSE UR STRIP.AUTO-MCNC: ABNORMAL MG/DL
HCT VFR BLD AUTO: 39.5 % (ref 41–52)
HGB BLD-MCNC: 13.3 G/DL (ref 13.5–17.5)
KETONES UR STRIP.AUTO-MCNC: NEGATIVE MG/DL
LEUKOCYTE ESTERASE UR QL STRIP.AUTO: NEGATIVE
MCH RBC QN AUTO: 30.6 PG (ref 26–34)
MCHC RBC AUTO-ENTMCNC: 33.7 G/DL (ref 32–36)
MCV RBC AUTO: 91 FL (ref 80–100)
MICROALBUMIN UR-MCNC: <7 MG/L
MICROALBUMIN/CREAT UR: NORMAL MG/G{CREAT}
NITRITE UR QL STRIP.AUTO: NEGATIVE
NRBC BLD-RTO: 0 /100 WBCS (ref 0–0)
PH UR STRIP.AUTO: 5 [PH]
PHOSPHATE SERPL-MCNC: 3.5 MG/DL (ref 2.5–4.9)
PLATELET # BLD AUTO: 246 X10*3/UL (ref 150–450)
POTASSIUM SERPL-SCNC: 4.4 MMOL/L (ref 3.5–5.3)
PROT UR STRIP.AUTO-MCNC: NEGATIVE MG/DL
PROT UR-ACNC: 5 MG/DL (ref 5–25)
PROT/CREAT UR: 0.1 MG/MG CREAT (ref 0–0.17)
RBC # BLD AUTO: 4.34 X10*6/UL (ref 4.5–5.9)
RBC # UR STRIP.AUTO: NEGATIVE /UL
SODIUM SERPL-SCNC: 136 MMOL/L (ref 136–145)
SP GR UR STRIP.AUTO: 1.01
UROBILINOGEN UR STRIP.AUTO-MCNC: NORMAL MG/DL
VIT B12 SERPL-MCNC: 356 PG/ML (ref 211–911)
WBC # BLD AUTO: 10 X10*3/UL (ref 4.4–11.3)

## 2024-06-14 PROCEDURE — 80069 RENAL FUNCTION PANEL: CPT

## 2024-06-14 PROCEDURE — 82043 UR ALBUMIN QUANTITATIVE: CPT

## 2024-06-14 PROCEDURE — 36415 COLL VENOUS BLD VENIPUNCTURE: CPT

## 2024-06-14 PROCEDURE — 82607 VITAMIN B-12: CPT

## 2024-06-14 PROCEDURE — 82306 VITAMIN D 25 HYDROXY: CPT

## 2024-06-14 PROCEDURE — 81003 URINALYSIS AUTO W/O SCOPE: CPT

## 2024-06-14 PROCEDURE — 83970 ASSAY OF PARATHORMONE: CPT

## 2024-06-14 PROCEDURE — 82570 ASSAY OF URINE CREATININE: CPT

## 2024-06-14 PROCEDURE — 85027 COMPLETE CBC AUTOMATED: CPT

## 2024-06-14 PROCEDURE — 84156 ASSAY OF PROTEIN URINE: CPT

## 2024-06-15 LAB — PTH-INTACT SERPL-MCNC: 138.6 PG/ML (ref 18.5–88)

## 2024-06-18 DIAGNOSIS — G20.A1 PARKINSON'S DISEASE, UNSPECIFIED WHETHER DYSKINESIA PRESENT, UNSPECIFIED WHETHER MANIFESTATIONS FLUCTUATE (MULTI): ICD-10-CM

## 2024-06-18 RX ORDER — CARBIDOPA AND LEVODOPA 25; 100 MG/1; MG/1
TABLET ORAL
Qty: 810 TABLET | Refills: 1 | Status: SHIPPED | OUTPATIENT
Start: 2024-06-18

## 2024-06-19 ENCOUNTER — APPOINTMENT (OUTPATIENT)
Dept: NEPHROLOGY | Facility: CLINIC | Age: 74
End: 2024-06-19
Payer: COMMERCIAL

## 2024-06-19 VITALS
WEIGHT: 258 LBS | HEIGHT: 74 IN | OXYGEN SATURATION: 95 % | DIASTOLIC BLOOD PRESSURE: 62 MMHG | TEMPERATURE: 97.9 F | SYSTOLIC BLOOD PRESSURE: 95 MMHG | HEART RATE: 95 BPM | BODY MASS INDEX: 33.11 KG/M2

## 2024-06-19 DIAGNOSIS — N18.31 HYPERTENSIVE KIDNEY DISEASE WITH STAGE 3A CHRONIC KIDNEY DISEASE (MULTI): Primary | ICD-10-CM

## 2024-06-19 DIAGNOSIS — I12.9 HYPERTENSIVE KIDNEY DISEASE WITH STAGE 3A CHRONIC KIDNEY DISEASE (MULTI): Primary | ICD-10-CM

## 2024-06-19 PROCEDURE — 3048F LDL-C <100 MG/DL: CPT | Performed by: INTERNAL MEDICINE

## 2024-06-19 PROCEDURE — 3074F SYST BP LT 130 MM HG: CPT | Performed by: INTERNAL MEDICINE

## 2024-06-19 PROCEDURE — 3078F DIAST BP <80 MM HG: CPT | Performed by: INTERNAL MEDICINE

## 2024-06-19 PROCEDURE — 1160F RVW MEDS BY RX/DR IN RCRD: CPT | Performed by: INTERNAL MEDICINE

## 2024-06-19 PROCEDURE — 99213 OFFICE O/P EST LOW 20 MIN: CPT | Performed by: INTERNAL MEDICINE

## 2024-06-19 PROCEDURE — 3008F BODY MASS INDEX DOCD: CPT | Performed by: INTERNAL MEDICINE

## 2024-06-19 PROCEDURE — 1159F MED LIST DOCD IN RCRD: CPT | Performed by: INTERNAL MEDICINE

## 2024-06-19 PROCEDURE — 3051F HG A1C>EQUAL 7.0%<8.0%: CPT | Performed by: INTERNAL MEDICINE

## 2024-06-19 PROCEDURE — 4010F ACE/ARB THERAPY RXD/TAKEN: CPT | Performed by: INTERNAL MEDICINE

## 2024-06-19 PROCEDURE — 3062F POS MACROALBUMINURIA REV: CPT | Performed by: INTERNAL MEDICINE

## 2024-06-19 NOTE — PROGRESS NOTES
Chief Complaint: Follow up CKD    No specific complaints  [facility] for urinary tract infection  Just out of [facility] rehab x 3 weeks   Denies nausea, vomiting, chest pain, dyspnea  No urinary symptoms  Not taking lokelma    NAD  Sclera AI s inj  MMM, no sores  Deferred secondary to COVID  1 + edema, chronic L > R wrapped (chronic)  No tremor  No rash    CKD stage 3a  HTN well controlled  Proteinuria no significant  HPT    No med changes  Refer back to PCP for routine screening twice yearly  Refer back if needed

## 2024-06-21 ENCOUNTER — TELEPHONE (OUTPATIENT)
Dept: PRIMARY CARE | Facility: CLINIC | Age: 74
End: 2024-06-21
Payer: COMMERCIAL

## 2024-06-27 ENCOUNTER — APPOINTMENT (OUTPATIENT)
Dept: PRIMARY CARE | Facility: CLINIC | Age: 74
End: 2024-06-27
Payer: COMMERCIAL

## 2024-06-27 VITALS
SYSTOLIC BLOOD PRESSURE: 99 MMHG | HEIGHT: 74 IN | HEART RATE: 87 BPM | BODY MASS INDEX: 32.55 KG/M2 | WEIGHT: 253.6 LBS | OXYGEN SATURATION: 94 % | DIASTOLIC BLOOD PRESSURE: 64 MMHG

## 2024-06-27 DIAGNOSIS — I10 PRIMARY HYPERTENSION: Primary | ICD-10-CM

## 2024-06-27 PROCEDURE — 3062F POS MACROALBUMINURIA REV: CPT | Performed by: STUDENT IN AN ORGANIZED HEALTH CARE EDUCATION/TRAINING PROGRAM

## 2024-06-27 PROCEDURE — 3078F DIAST BP <80 MM HG: CPT | Performed by: STUDENT IN AN ORGANIZED HEALTH CARE EDUCATION/TRAINING PROGRAM

## 2024-06-27 PROCEDURE — 4010F ACE/ARB THERAPY RXD/TAKEN: CPT | Performed by: STUDENT IN AN ORGANIZED HEALTH CARE EDUCATION/TRAINING PROGRAM

## 2024-06-27 PROCEDURE — 1160F RVW MEDS BY RX/DR IN RCRD: CPT | Performed by: STUDENT IN AN ORGANIZED HEALTH CARE EDUCATION/TRAINING PROGRAM

## 2024-06-27 PROCEDURE — 99214 OFFICE O/P EST MOD 30 MIN: CPT | Performed by: STUDENT IN AN ORGANIZED HEALTH CARE EDUCATION/TRAINING PROGRAM

## 2024-06-27 PROCEDURE — 3048F LDL-C <100 MG/DL: CPT | Performed by: STUDENT IN AN ORGANIZED HEALTH CARE EDUCATION/TRAINING PROGRAM

## 2024-06-27 PROCEDURE — 1036F TOBACCO NON-USER: CPT | Performed by: STUDENT IN AN ORGANIZED HEALTH CARE EDUCATION/TRAINING PROGRAM

## 2024-06-27 PROCEDURE — 3051F HG A1C>EQUAL 7.0%<8.0%: CPT | Performed by: STUDENT IN AN ORGANIZED HEALTH CARE EDUCATION/TRAINING PROGRAM

## 2024-06-27 PROCEDURE — 3074F SYST BP LT 130 MM HG: CPT | Performed by: STUDENT IN AN ORGANIZED HEALTH CARE EDUCATION/TRAINING PROGRAM

## 2024-06-27 PROCEDURE — 1159F MED LIST DOCD IN RCRD: CPT | Performed by: STUDENT IN AN ORGANIZED HEALTH CARE EDUCATION/TRAINING PROGRAM

## 2024-06-27 RX ORDER — CHOLECALCIFEROL (VITAMIN D3) 25 MCG
1000 TABLET ORAL DAILY
COMMUNITY

## 2024-06-27 ASSESSMENT — ENCOUNTER SYMPTOMS
DEPRESSION: 0
OCCASIONAL FEELINGS OF UNSTEADINESS: 1
LOSS OF SENSATION IN FEET: 0

## 2024-06-27 NOTE — PATIENT INSTRUCTIONS
Home Bps are good but low at the office .   Continue hydrochlorothiazide 25mg  but lower Losartan to 25mg .   Continue metoprolol.      We are on a new system that is paperless.     Lab location for blood work :  1. Located across the office , just past the elevators .   [address]. [address].  If you are coming on a Saturday, go to [address] for the blood draw    Radiology : [address] for Xrays  You can also schedule non urgent imaging by calling .     For scheduling appts, call . You might be receiving call from central scheduling as well.    For scheduling colonoscopy, call .     For scheduling physical therapy, call [phone] ( [phone]).    For any other scheduling questions or locations, please ask the medical assistant or the .

## 2024-06-27 NOTE — PROGRESS NOTES
"Subjective   Patient ID: Huber Lindsey is a 73 y.o. male who presents for Follow-up (Follow-up on new med. ).        HPI      Fu on BP , is here with his sis margarita   At the last visit , I advised to dc torsemide and start hydrochlorothiazide 25mg   BP low today but home BP well controlled in the 120s             Visit Vitals  BP 99/64   Pulse 87   Ht 1.88 m (6' 2\")   Wt 115 kg (253 lb 9.6 oz)   SpO2 94%   BMI 32.56 kg/m²   Smoking Status Former   BSA 2.45 m²      No LMP for male patient.   Current Outpatient Medications   Medication Instructions    acetaminophen (ACETAMINOPHEN EXTRA STRENGTH) 500 mg, oral, 2 times daily    allopurinol (ZYLOPRIM) 100 mg, oral, Daily    aspirin 81 mg, oral, Daily    atorvastatin (LIPITOR) 20 mg, oral, Daily    carbidopa-levodopa (Sinemet)  mg tablet TAKE 3 TABLETS BY MOUTH THREE TIMES DAILY    cholecalciferol (VITAMIN D3) 1,000 Units, oral, Daily    dapagliflozin propanediol (FARXIGA) 10 mg, oral, Every morning    famotidine (Pepcid) 20 mg tablet 1 tablet, oral, Nightly    fluticasone (Flonase) 50 mcg/actuation nasal spray nasal    hydroCHLOROthiazide (HYDRODIURIL) 25 mg, oral, Daily    losartan (COZAAR) 50 mg, oral, Daily    metoprolol tartrate (LOPRESSOR) 50 mg, oral, Every 12 hours    multivit-iron-minerals-folic acid (Centrum Silver) 0.4 mg-300 mcg- 250 mcg tab 1 tablet, oral, Daily    NIFEdipine ER (ADALAT CC) 30 mg, oral, Daily    sodium zirconium cyclosilicate (Lokelma) 10 gram packet TAKE 1 PACKET ONCE DAILY    Trulicity 1.5 mg, subcutaneous, Once Weekly      Social History     Tobacco Use    Smoking status: Former     Current packs/day: 0.00     Types: Cigarettes     Quit date:      Years since quittin.5    Smokeless tobacco: Never   Substance Use Topics    Alcohol use: Not Currently        Review of Systems    Constitutional : No feeling poorly / fevers/ chills / night sweats/ fatigue   Cardiovascular : No CP /Palpitations/ lower extremity edema / syncope "   Respiratory : No Cough /HELLER/Dyspnea at rest   Gastrointestinal : No abd pain / N/V  No bloody stools/ melena / constipation  Endo : No polyuria/polydipsia/ muscle weakness / sluggishness   CNS: No confusion / HA/ tingling/ numbness/ weakness of extremities  Psychiatric: No anxiety/ depression/ SI/HI    All other systems have been reviewed and are negative for complaint       Physical Exam    Constitutional : Vitals reviewed. Alert and in no distress  Cardiovascular : RRR, Normal S1, S2, No pericardial rub/ gallop, no peripheral edema   Pulmonary: No respiratory distress, CTAB     Neurologic : CNs 2-12 grossly intact , no obvious FNDs  Psych : A,Ox3, normal mood and affect      Assessment/Plan   Diagnoses and all orders for this visit:  Primary hypertension      Lower losartan to 25mg continue other meds   rTO in 2m for MCW and BP mgmt                 Conditions addressed and mgmt as noted above.  Pertinent labs, images/ imaging reports , chart review was done .   Age appropriate labs / labs for mgmt of chronic medical conditions ordered, further mgmt pending the results.

## 2024-07-24 DIAGNOSIS — M1A.9XX0 CHRONIC GOUT WITHOUT TOPHUS, UNSPECIFIED CAUSE, UNSPECIFIED SITE: ICD-10-CM

## 2024-07-24 DIAGNOSIS — E79.0 HYPERURICEMIA: ICD-10-CM

## 2024-07-25 RX ORDER — ALLOPURINOL 100 MG/1
100 TABLET ORAL DAILY
Qty: 90 TABLET | Refills: 3 | Status: SHIPPED | OUTPATIENT
Start: 2024-07-25

## 2024-07-30 ENCOUNTER — APPOINTMENT (OUTPATIENT)
Dept: NEUROLOGY | Facility: CLINIC | Age: 74
End: 2024-07-30
Payer: COMMERCIAL

## 2024-07-30 VITALS
BODY MASS INDEX: 33.25 KG/M2 | HEART RATE: 83 BPM | WEIGHT: 259 LBS | DIASTOLIC BLOOD PRESSURE: 65 MMHG | RESPIRATION RATE: 16 BRPM | SYSTOLIC BLOOD PRESSURE: 103 MMHG

## 2024-07-30 DIAGNOSIS — G20.A1 PARKINSON'S DISEASE, UNSPECIFIED WHETHER DYSKINESIA PRESENT, UNSPECIFIED WHETHER MANIFESTATIONS FLUCTUATE (MULTI): ICD-10-CM

## 2024-07-30 PROCEDURE — 3078F DIAST BP <80 MM HG: CPT | Performed by: PSYCHIATRY & NEUROLOGY

## 2024-07-30 PROCEDURE — 1160F RVW MEDS BY RX/DR IN RCRD: CPT | Performed by: PSYCHIATRY & NEUROLOGY

## 2024-07-30 PROCEDURE — 4010F ACE/ARB THERAPY RXD/TAKEN: CPT | Performed by: PSYCHIATRY & NEUROLOGY

## 2024-07-30 PROCEDURE — 3051F HG A1C>EQUAL 7.0%<8.0%: CPT | Performed by: PSYCHIATRY & NEUROLOGY

## 2024-07-30 PROCEDURE — 1036F TOBACCO NON-USER: CPT | Performed by: PSYCHIATRY & NEUROLOGY

## 2024-07-30 PROCEDURE — 99214 OFFICE O/P EST MOD 30 MIN: CPT | Performed by: PSYCHIATRY & NEUROLOGY

## 2024-07-30 PROCEDURE — 3062F POS MACROALBUMINURIA REV: CPT | Performed by: PSYCHIATRY & NEUROLOGY

## 2024-07-30 PROCEDURE — 1159F MED LIST DOCD IN RCRD: CPT | Performed by: PSYCHIATRY & NEUROLOGY

## 2024-07-30 PROCEDURE — 3074F SYST BP LT 130 MM HG: CPT | Performed by: PSYCHIATRY & NEUROLOGY

## 2024-07-30 PROCEDURE — G2211 COMPLEX E/M VISIT ADD ON: HCPCS | Performed by: PSYCHIATRY & NEUROLOGY

## 2024-07-30 PROCEDURE — 3048F LDL-C <100 MG/DL: CPT | Performed by: PSYCHIATRY & NEUROLOGY

## 2024-07-30 RX ORDER — CARBIDOPA AND LEVODOPA 25; 100 MG/1; MG/1
TABLET ORAL
Qty: 810 TABLET | Refills: 3 | Status: SHIPPED | OUTPATIENT
Start: 2024-07-30

## 2024-07-30 ASSESSMENT — ENCOUNTER SYMPTOMS
LOSS OF SENSATION IN FEET: 0
DEPRESSION: 0
OCCASIONAL FEELINGS OF UNSTEADINESS: 0

## 2024-07-30 NOTE — PATIENT INSTRUCTIONS
Mr Lindsey,    Thank you for letting me take part in your care! You were seen in the neurology clinic today for tremors. Ultimately, we planned to continue your current medication dosages.    Return to clinic in 7-8 months.    Thank you,  Sylvester Lee MD (Resident)  Manuel Hernandez MD (Attending)

## 2024-07-30 NOTE — PROGRESS NOTES
"[facility] MOVEMENT DISORDERS OUTPATIENT FOLLOW-UP NOTE  Subjective   Patient Overview  Huber Lindsey is a 74 y.o. right handed male with a history notable for HTN, NID-T2DM, and CKD3a who presents to the neurology clinic for followup of tremor. The patient has carried the diagnosis of slowly progressive idiopathic Parkinson's Disease (iPD).    Previous Neurologic History  Briefly, the patient was first seen in neurology clinic in February of 2018. He had been sitting on his couch when he reportedly had sudden inability to move his body. Was brought to the ED were workup was negative. States he was not paralyzed; he could still move but it was just incredibly hard to get his muscles moving. Was also developing a LUE rest tremor. On establishing with neuro, he was started on C/L with a near-miraculous resolution of his symptoms (only occasional L rest tremor remaining) which has been mild since that time. At his last visit, he was continued on C/L  3 tabs TID.    Interval History  In the interim, the patient states that his tremor has been \"about the same.\" Does cause some trouble with eating soup or such but really does not interfere with life. Overall, nothing has progressed since 2018. Not disabling. Tremor seems to wax and wane, most notable with relaxation. Takes C/L TID (0700, 1200, 1700) and does not notice any off-phenomena.     Most recent medical change is a UTI several months ago, during which he ended up at [facility] for a week and then went to rehab for 3 weeks. Then had home PT for an additional month. He remains independent with ADLs. No gait changes; no falls; no freezing. No difficulty buttoning shirts, getting out of bed. No changes in handwriting. No diplopia. No pronounced cognitive change. No impulsive behaviors. No auditory/visual hallucinations. No anxiety/depressive symptoms. Occasional constipation; improves with more hydration.      ROS:  A comprehensive " review-of-systems was obtained and negative unless noted above in the HPI.    Past Medical History  Past Medical History:   Diagnosis Date    Age-related nuclear cataract, left eye 02/19/2019    Age-related nuclear cataract of left eye    Age-related nuclear cataract, right eye 02/19/2019    Age-related nuclear cataract of right eye    Chronic viral hepatitis C (Multi) 03/02/2022    Chronic hepatitis C    Cortical age-related cataract, right eye 02/19/2019    Cortical age-related cataract of right eye    Encounter for follow-up examination after completed treatment for conditions other than malignant neoplasm 12/17/2019    Hospital discharge follow-up    Essential (primary) hypertension     Benign essential hypertension    Essential (primary) hypertension 03/02/2022    Essential hypertension    Parkinson's disease (Multi) 05/10/2022    Parkinsons disease    Personal history of other diseases of the circulatory system 12/04/2019    History of coronary artery disease    Personal history of other diseases of the nervous system and sense organs 01/15/2020    History of peripheral neuropathy    Personal history of other endocrine, nutritional and metabolic disease 10/11/2022    History of hyperkalemia    Personal history of other specified conditions 01/15/2020    History of tachycardia    Personal history of urinary (tract) infections 01/15/2020    History of urinary tract infection    Polyosteoarthritis, unspecified 01/18/2018    Generalized osteoarthrosis, involving multiple sites    Posterior subcapsular polar age-related cataract, right eye 02/19/2019    Posterior subcapsular polar age-related cataract of right eye    Type 2 diabetes mellitus with mild nonproliferative diabetic retinopathy without macular edema, unspecified eye (Multi) 02/19/2019    NPDR (nonproliferative diabetic retinopathy)    Type 2 diabetes mellitus without complications (Multi) 02/19/2019    Diabetes mellitus    Type 2 diabetes mellitus  without complications (Multi) 2021    Type 2 diabetes mellitus    Unspecified cirrhosis of liver (Multi) 2022    Liver cirrhosis    Unspecified visual loss 2018    Vision impairment     Surgical History  Past Surgical History:   Procedure Laterality Date    MR HEAD ANGIO WO IV CONTRAST  2013    MR HEAD ANGIO WO IV CONTRAST 2013 [facility] CLINICAL LEGACY    MR NECK ANGIO WO IV CONTRAST  2013    MR NECK ANGIO WO IV CONTRAST 2013 [facility] CLINICAL LEGACY    OTHER SURGICAL HISTORY  2018    Biopsy Of Liver    RETINAL DETACHMENT SURGERY  2018    Repair Of Retinal Detachment    US GUIDED NEEDLE LIVER BIOPSY  3/27/2018    US GUIDED NEEDLE LIVER BIOPSY 3/27/2018 [facility] LEGACY     Social History  Social History     Tobacco Use    Smoking status: Former     Current packs/day: 0.00     Types: Cigarettes     Quit date:      Years since quittin.5    Smokeless tobacco: Never   Vaping Use    Vaping status: Never Used   Substance Use Topics    Alcohol use: Not Currently    Drug use: Not Currently     Allergies  Metformin, Naproxen, and Nut - unspecified      =========  Medications    Current Outpatient Medications:     acetaminophen (Acetaminophen Extra Strength) 500 mg tablet, Take 1 tablet (500 mg) by mouth 2 times a day., Disp: , Rfl:     allopurinol (Zyloprim) 100 mg tablet, Take 1 tablet by mouth once daily, Disp: 90 tablet, Rfl: 3    aspirin 81 mg EC tablet, Take 1 tablet (81 mg) by mouth once daily., Disp: , Rfl:     atorvastatin (Lipitor) 20 mg tablet, Take 1 tablet by mouth once daily, Disp: 90 tablet, Rfl: 3    carbidopa-levodopa (Sinemet)  mg tablet, TAKE 3 TABLETS BY MOUTH THREE TIMES DAILY, Disp: 810 tablet, Rfl: 1    cholecalciferol (Vitamin D3) 25 MCG (1000 UT) tablet, Take 1 tablet (1,000 Units) by mouth once daily., Disp: , Rfl:     dapagliflozin propanediol (Farxiga) 10 mg, Take 1 tablet (10 mg) by mouth once daily in the morning., Disp: 90 tablet, Rfl:  1    dulaglutide (Trulicity) 1.5 mg/0.5 mL pen injector injection, Inject 1.5 mg under the skin 1 (one) time per week., Disp: 6 mL, Rfl: 3    famotidine (Pepcid) 20 mg tablet, Take 1 tablet (20 mg) by mouth once daily at bedtime., Disp: , Rfl:     fluticasone (Flonase) 50 mcg/actuation nasal spray, Administer into affected nostril(s)., Disp: , Rfl:     hydroCHLOROthiazide (HYDRODiuril) 25 mg tablet, Take 1 tablet (25 mg) by mouth once daily., Disp: 90 tablet, Rfl: 1    losartan (Cozaar) 50 mg tablet, Take 1 tablet (50 mg) by mouth once daily., Disp: 90 tablet, Rfl: 3    metoprolol tartrate (Lopressor) 50 mg tablet, Take 1 tablet by mouth every 12 hours., Disp: 180 tablet, Rfl: 1    multivit-iron-minerals-folic acid (Centrum Silver) 0.4 mg-300 mcg- 250 mcg tab, Take 1 tablet by mouth once daily., Disp: , Rfl:     NIFEdipine ER (NIFEdipine CC) 30 mg 24 hr tablet, Take 1 tablet (30 mg) by mouth once daily., Disp: 90 tablet, Rfl: 1    sodium zirconium cyclosilicate (Lokelma) 10 gram packet, TAKE 1 PACKET ONCE DAILY, Disp: 90 packet, Rfl: 3     =========      Objective   Vital Signs  /65 (BP Location: Left arm, Patient Position: Sitting, BP Cuff Size: Large adult)   Pulse 83   Resp 16   Wt 117 kg (259 lb)   BMI 33.25 kg/m²     Physical Exam  GENERAL: sitting up in chair comfortably; well-appearing and in NAD. Appears stated age.  PSYCHIATRIC/MSE: conversational; full euthymic affect; logical thought process; cognition intact.  NEUROLOGICAL:     Cognitive Status: A&Ox4. Language expression, naming, and repetition intact. Remains focused during interview.     Cranial Nerves: R monocular vision loss. PERRL (3 -> 2) b/l with R RAPD; EOMI w/ smooth pursuits; face sensation & strength symmetric; tongue midline; shoulders strong.     Funduscopic: deferred     Motor: Trace RUE cogwheeling. Intermittent LUE rest tremor. No spasticity.     Strength: R hip flexion 5-, L hip flexion 4. Remainder of extremities are 5/5.      Reflexes: 2+ to uppers; absent to lowers. Capellan's absent.     Sensory: intact and symmetric to light touch     Coordination: normokinetic; L>R action tremor with FtN.     Rapid Movements: moderate left-sided finger & foot tapping bradykinesia on the left; none on the right.     Gait: slightly narrow-based gait on ambulation with reduced LUE arm swing.    UPDRS III  Time of Exam: 1347  Time of Last Levodopa Dose: 1200  Clinical State: ON-State                     Speech: 0                  Facial Expression: 0    Rest Tremor:                  Head: 0  RUE:  0                     LUE: 2  RLE:   0                     LLE:  0     Action Tremor:  RUE:  1                    LUE:  2      Rigidity:                  Neck: 0  RUE:  1                       LUE:  0  RLE:  0                        LLE:  0     Finger Taps:  RUE:   0.5                     LUE:  0.5     Hand Movements:  RUE:  0                         LUE: 1     Rapid Alternating Movements:  RUE: 1                          LUE: 2     Leg Agility:  RLE: 0                          LLE: 0                      Arise from chair: 2                  Posture: 0                  Gait: 1                  Posture Stability: 0                  Body Bradykinesia: 0      Right Score: 3.5  Left Score: 7.5  Axial Score: 3  Total UPDRS III: 14      Recent/Relevant Labs:  Hemoglobin   Date/Time Value Ref Range Status   06/14/2024 10:46 AM 13.3 (L) 13.5 - 17.5 g/dL Final     WBC   Date/Time Value Ref Range Status   06/14/2024 10:46 AM 10.0 4.4 - 11.3 x10*3/uL Final     Sodium   Date/Time Value Ref Range Status   06/14/2024 10:46  136 - 145 mmol/L Final     Potassium   Date/Time Value Ref Range Status   06/14/2024 10:46 AM 4.4 3.5 - 5.3 mmol/L Final     Chloride   Date/Time Value Ref Range Status   06/14/2024 10:46  98 - 107 mmol/L Final     Urea Nitrogen   Date/Time Value Ref Range Status   06/14/2024 10:46 AM 48 (H) 6 - 23 mg/dL Final     Creatinine   Date/Time Value  Ref Range Status   06/14/2024 10:46 AM 1.28 0.50 - 1.30 mg/dL Final     Magnesium   Date/Time Value Ref Range Status   07/10/2020 12:15 AM 2.37 1.60 - 2.40 mg/dL Final     Phosphorus   Date/Time Value Ref Range Status   06/14/2024 10:46 AM 3.5 2.5 - 4.9 mg/dL Final     Comment:     The performance characteristics of phosphorus testing in heparinized plasma have been validated by the individual  laboratory site where testing is performed. Testing on heparinized plasma is not approved by the FDA; however, such approval is not necessary.     Calcium   Date/Time Value Ref Range Status   06/14/2024 10:46 AM 9.6 8.6 - 10.6 mg/dL Final     Total Protein   Date/Time Value Ref Range Status   05/20/2024 11:55 AM 7.9 6.4 - 8.2 g/dL Final     Albumin   Date/Time Value Ref Range Status   06/14/2024 10:46 AM 4.3 3.4 - 5.0 g/dL Final     Bilirubin, Total   Date/Time Value Ref Range Status   05/20/2024 11:55 AM 1.4 (H) 0.0 - 1.2 mg/dL Final     Bilirubin, Direct   Date/Time Value Ref Range Status   07/10/2020 12:15 AM 0.1 0.0 - 0.3 mg/dL Final     AST   Date/Time Value Ref Range Status   05/20/2024 11:55 AM 22 9 - 39 U/L Final     ALT   Date/Time Value Ref Range Status   05/20/2024 11:55 AM 8 (L) 10 - 52 U/L Final     Comment:     Patients treated with Sulfasalazine may generate falsely decreased results for ALT.     Alkaline Phosphatase   Date/Time Value Ref Range Status   05/20/2024 11:55 AM 84 33 - 136 U/L Final     Total CK   Date/Time Value Ref Range Status   11/25/2019 06:10  0 - 325 U/L Final     Lactate   Date/Time Value Ref Range Status   11/24/2019 08:00 PM 0.9 0.4 - 2.0 mmol/L Final     Comment:      Venipuncture immediately after or during the    administration of Metamizole may lead to falsely   low results. Testing should be performed immediately   prior to Metamizole dosing.        LDL   Date/Time Value Ref Range Status   04/29/2023 10:35 AM 42 0 - 99 mg/dL Final     Comment:     .                            NEAR      BORD      AGE      DESIRABLE  OPTIMAL    HIGH     HIGH     VERY HIGH     0-19 Y     0 - 109     ---    110-129   >/= 130     ----    20-24 Y     0 - 119     ---    120-159   >/= 160     ----      >24 Y     0 -  99   100-129  130-159   160-189     >/=190  .     02/23/2022 12:23 PM 57 0 - 99 mg/dL Final     Comment:     .                           NEAR      BORD      AGE      DESIRABLE  OPTIMAL    HIGH     HIGH     VERY HIGH     0-19 Y     0 - 109     ---    110-129   >/= 130     ----    20-24 Y     0 - 119     ---    120-159   >/= 160     ----      >24 Y     0 -  99   100-129  130-159   160-189     >/=190  .     02/22/2021 12:00 AM 91 0 - 99 mg/dL Final     Comment:     .                           NEAR      BORD      AGE      DESIRABLE  OPTIMAL    HIGH     HIGH     VERY HIGH     0-19 Y     0 - 109     ---    110-129   >/= 130     ----    20-24 Y     0 - 119     ---    120-159   >/= 160     ----      >24 Y     0 -  99   100-129  130-159   160-189     >/=190  .       Triglycerides   Date/Time Value Ref Range Status   05/20/2024 11:55  (H) 0 - 149 mg/dL Final     Comment:        Age         Desirable   Borderline High   High     Very High   0 D-90 D    19 - 174         ----         ----        ----  91 D- 9 Y     0 -  74        75 -  99     >/= 100      ----    10-19 Y     0 -  89        90 - 129     >/= 130      ----    20-24 Y     0 - 114       115 - 149     >/= 150      ----         >24 Y     0 - 149       150 - 199    200- 499    >/= 500    Venipuncture immediately after or during the administration of Metamizole may lead to falsely low results. Testing should be performed immediately prior to Metamizole dosing.     TR HGBA1C (Data Conversion)   Date/Time Value Ref Range Status   02/20/2018 11:57 AM 7.1 (H) 4.2 - 6.5 % Final     Hemoglobin A1C   Date/Time Value Ref Range Status   05/20/2024 11:55 AM 7.3 (H) see below % Final   04/17/2024 04:35 AM 6.8 (H) 4.3 - 5.6 % Final     Comment:     American  Diabetes Association guidelines indicate that patients with HgbA1c in the range 5.7-6.4% are at increased risk for development of diabetes, and intervention by lifestyle modification may be beneficial. HgbA1c greater or equal to 6.5% is considered diagnostic of diabetes.   12/20/2023 10:50 AM 6.7 (H) see below % Final   04/29/2023 10:35 AM 6.7 (A) % Final     Comment:          Diagnosis of Diabetes-Adults   Non-Diabetic: < or = 5.6%   Increased risk for developing diabetes: 5.7-6.4%   Diagnostic of diabetes: > or = 6.5%  .       Monitoring of Diabetes                Age (y)     Therapeutic Goal (%)   Adults:          >18           <7.0   Pediatrics:    13-18           <7.5                   7-12           <8.0                   0- 6            7.5-8.5   American Diabetes Association. Diabetes Care 33(S1), Jan 2010.     02/02/2023 02:32 PM 6.8 (A) % Final     Comment:          Diagnosis of Diabetes-Adults   Non-Diabetic: < or = 5.6%   Increased risk for developing diabetes: 5.7-6.4%   Diagnostic of diabetes: > or = 6.5%  .       Monitoring of Diabetes                Age (y)     Therapeutic Goal (%)   Adults:          >18           <7.0   Pediatrics:    13-18           <7.5                   7-12           <8.0                   0- 6            7.5-8.5   American Diabetes Association. Diabetes Care 33(S1), Jan 2010.     09/20/2022 02:14 PM 6.8 (A) % Final     Comment:          Diagnosis of Diabetes-Adults   Non-Diabetic: < or = 5.6%   Increased risk for developing diabetes: 5.7-6.4%   Diagnostic of diabetes: > or = 6.5%  .       Monitoring of Diabetes                Age (y)     Therapeutic Goal (%)   Adults:          >18           <7.0   Pediatrics:    13-18           <7.5                   7-12           <8.0                   0- 6            7.5-8.5   American Diabetes Association. Diabetes Care 33(S1), Jan 2010.     09/14/2020 04:19 AM 5.1 4.3 - 5.6 % Final     Comment:     American Diabetes Association guidelines  indicate that patients with HgbA1c in   the range 5.7-6.4% are at increased risk for development of diabetes, and   intervention by lifestyle modification may be beneficial. HgbA1c greater or   equal to 6.5% is considered diagnostic of diabetes.   05/13/2020 06:43 AM 5.8 (H) 4.3 - 5.6 % Final     Comment:     American Diabetes Association guidelines indicate that patients with HgbA1c in   the range 5.7-6.4% are at increased risk for development of diabetes, and   intervention by lifestyle modification may be beneficial. HgbA1c greater or   equal to 6.5% is considered diagnostic of diabetes.     Estimated Average Glucose   Date/Time Value Ref Range Status   05/20/2024 11:55  Not Established mg/dL Final   04/17/2024 04:35  mg/dL Final     Comment:     eAG: (Estimated average glucose) is a calculated value from HgbA1c and is representative of the average blood glucose level in the last 2-3 month period.     BNP   Date/Time Value Ref Range Status   01/12/2020 05:48 AM 24 0 - 99 pg/mL Final     Comment:     .  <100 pg/mL - Heart failure unlikely  100-299 pg/mL - Intermediate probability of acute heart  .               failure exacerbation. Correlate with clinical  .               context and patient history.    >=300 pg/mL - Heart Failure likely. Correlate with clinical  .               context and patient history.  BNP testing is performed using different testing   methodology at [facility] than at other   system hospitals. Direct result comparisons should   only be made within the same method.       Thyroid Stimulating Hormone   Date/Time Value Ref Range Status   05/20/2024 11:55 AM 1.86 0.44 - 3.98 mIU/L Final     TSH   Date/Time Value Ref Range Status   04/29/2023 10:35 AM 2.43 0.44 - 3.98 mIU/L Final     Comment:      TSH testing is performed using different testing    methodology at [facility] than at other    system hospitals. Direct result comparisons should    only be made  within the same method.     02/23/2022 12:23 PM 1.85 0.44 - 3.98 mIU/L Final     Comment:      TSH testing is performed using different testing    methodology at [facility] than at other    system [facility]. Direct result comparisons should    only be made within the same method.     02/22/2021 12:00 AM 3.36 0.44 - 3.98 mIU/L Final     Comment:      TSH testing is performed using different testing    methodology at [facility] than at other    [facility]. Direct result comparisons should    only be made within the same method.       Vitamin B12   Date/Time Value Ref Range Status   06/14/2024 10:46  211 - 911 pg/mL Final     Vitamin B-12   Date/Time Value Ref Range Status   02/23/2022 12:23  211 - 911 pg/mL Final   02/22/2021 12:00  211 - 911 pg/mL Final     Folate   Date/Time Value Ref Range Status   02/22/2021 12:00 AM 7.9 >5.0 ng/mL Final     Comment:     Low           <3.4  Borderline 3.4-5.0  Normal        >5.0  .   Biotin interference may cause falsely elevated results.    Patients taking a Biotin dose of up to 5 mg/day should    refrain from taking Biotin for 24 hours before sample    collection. Providers may contact their local laboratory   for further information.       Vitamin D, 25-Hydroxy, Total   Date/Time Value Ref Range Status   06/14/2024 10:46 AM 35 30 - 100 ng/mL Final     SPE Interpretation   Date/Time Value Ref Range Status   08/02/2023 02:34 PM ABNORMAL  Final     Comment:     Increase in polyclonal gamma globulins.       Recent/Relevant Imaging:  No MRI head results found for the past 12 months   CT head wo IV contrast    Result Date: 4/9/2024  * * *Final Report* * * DATE OF EXAM: Apr 8 2024 11:41PM   [facility]   0504  -  CT BRAIN WO IVCON   / ACCESSION #  [id number] PROCEDURE REASON: Transient ischemic attack (TIA)      * * * * Physician Interpretation * * * * RESULT: EXAMINATION: CT BRAIN WO IVCON CLINICAL HISTORY: Confusion. TECHNIQUE:  Serial  axial images without IV contrast were obtained from the vertex to the foramen magnum. MQ:  [id number] CT Radiation dose: Integrated Dose-Length Product (DLP) for this visit =   1042  mGy*cm CT Dose Reduction Employed: No dose reduction techniques were required COMPARISON: None. RESULT: Exam is moderately degraded by motion artifact. Post-operative change: Prior RIGHT orbital scleral banding and placement of a RIGHT ex-press device. Acute change: No evidence of an acute infarct or other acute parenchymal process. Hemorrhage: No evidence of acute intracranial hemorrhage. ECASS hemorrhagic transformation score: Not Applicable Mass Lesion / Mass Effect: There is no evidence of an intracranial mass or extraaxial fluid collection.  No significant mass effect. Chronic change: Patchy foci of  low attenuation coefficient are present within the supratentorial white matter which is a nonspecific finding but likely represents moderate microvascular ischemia. Parenchyma: There is moderate generalized volume loss. Ventricles: The ventricles are within normal limits of size and configuration for age. Paranasal sinuses and skull base: The visualized paranasal sinuses are grossly clear.   The skull base and imaged soft tissues are unremarkable.  Debris is present in the LEFT external auditory canal, presumably cerumen..  (topogram) images: No additional findings.    IMPRESSION: Exam is moderately degraded by motion artifact.  No acute intracranial abnormality identified within this limitation. Transcribed Using Voice Recognition Transcribe Date/Time: Apr 8 2024 11:55P Dictated by: FLOYD BLUE MD This examination was interpreted and the report reviewed and electronically signed by: FLOYD BLUE MD on Apr 8 2024 11:59PM  EST      Other Recent/Relevant Studies:  No EMG results found for the past 12 months   No EEG results found for the past 12 months   No echocardiogram results found for the past 12 months  No results found  for this or any previous visit (from the past 4464 hour(s)).         =========  Assessment/Plan   Assessment:  Huber Lindsey is a 74 y.o. right handed male with a history notable for HTN, NID-T2DM, and CKD3a who presents to the neurology clinic for followup of tremor. The patient has carried the diagnosis of slowly progressive idiopathic Parkinson's Disease (iPD). After an initial onset of severe bradykinesia and LUE rest tremor, the symptoms massively improved with C/L which has stayed at a stable dose since that time. Exam continues to reveal trace cogwheeling and LUE rest tremor which is activated most with ambulation. Symptoms remain well-managed and unchanged since last visit, so we will continue current therapy.    Impression: Tremor-Predominant Idiopathic Parkinson's Disease    Plan:  - continue C/L  3 tabs TID  - RTC in 7-8 months.      Sylvester Lee MD PGY-4  [facility] [place]  [employer]

## 2024-08-19 ENCOUNTER — LAB (OUTPATIENT)
Dept: LAB | Facility: LAB | Age: 74
End: 2024-08-19
Payer: COMMERCIAL

## 2024-08-19 ENCOUNTER — TELEPHONE (OUTPATIENT)
Dept: PRIMARY CARE | Facility: CLINIC | Age: 74
End: 2024-08-19
Payer: COMMERCIAL

## 2024-08-19 DIAGNOSIS — E11.51 TYPE 2 DIABETES MELLITUS WITH DIABETIC PERIPHERAL ANGIOPATHY WITHOUT GANGRENE, WITHOUT LONG-TERM CURRENT USE OF INSULIN (MULTI): ICD-10-CM

## 2024-08-19 DIAGNOSIS — E11.51 TYPE 2 DIABETES MELLITUS WITH DIABETIC PERIPHERAL ANGIOPATHY WITHOUT GANGRENE, WITHOUT LONG-TERM CURRENT USE OF INSULIN (MULTI): Primary | ICD-10-CM

## 2024-08-19 PROCEDURE — 83036 HEMOGLOBIN GLYCOSYLATED A1C: CPT

## 2024-08-19 PROCEDURE — 36415 COLL VENOUS BLD VENIPUNCTURE: CPT

## 2024-08-20 LAB
EST. AVERAGE GLUCOSE BLD GHB EST-MCNC: 148 MG/DL
HBA1C MFR BLD: 6.8 %

## 2024-08-27 ENCOUNTER — APPOINTMENT (OUTPATIENT)
Dept: PRIMARY CARE | Facility: CLINIC | Age: 74
End: 2024-08-27
Payer: COMMERCIAL

## 2024-08-27 VITALS
SYSTOLIC BLOOD PRESSURE: 124 MMHG | BODY MASS INDEX: 32.83 KG/M2 | DIASTOLIC BLOOD PRESSURE: 67 MMHG | WEIGHT: 255.8 LBS | OXYGEN SATURATION: 92 % | HEIGHT: 74 IN | HEART RATE: 87 BPM

## 2024-08-27 DIAGNOSIS — E78.2 MIXED HYPERLIPIDEMIA: ICD-10-CM

## 2024-08-27 DIAGNOSIS — E11.9 DIABETES MELLITUS WITHOUT COMPLICATION (MULTI): ICD-10-CM

## 2024-08-27 DIAGNOSIS — I10 PRIMARY HYPERTENSION: ICD-10-CM

## 2024-08-27 DIAGNOSIS — M10.9 GOUT, UNSPECIFIED CAUSE, UNSPECIFIED CHRONICITY, UNSPECIFIED SITE: ICD-10-CM

## 2024-08-27 DIAGNOSIS — Z12.5 SCREENING FOR PROSTATE CANCER: ICD-10-CM

## 2024-08-27 DIAGNOSIS — Z00.00 MEDICARE ANNUAL WELLNESS VISIT, SUBSEQUENT: Primary | ICD-10-CM

## 2024-08-27 PROCEDURE — 1036F TOBACCO NON-USER: CPT | Performed by: STUDENT IN AN ORGANIZED HEALTH CARE EDUCATION/TRAINING PROGRAM

## 2024-08-27 PROCEDURE — 3062F POS MACROALBUMINURIA REV: CPT | Performed by: STUDENT IN AN ORGANIZED HEALTH CARE EDUCATION/TRAINING PROGRAM

## 2024-08-27 PROCEDURE — 3074F SYST BP LT 130 MM HG: CPT | Performed by: STUDENT IN AN ORGANIZED HEALTH CARE EDUCATION/TRAINING PROGRAM

## 2024-08-27 PROCEDURE — 1170F FXNL STATUS ASSESSED: CPT | Performed by: STUDENT IN AN ORGANIZED HEALTH CARE EDUCATION/TRAINING PROGRAM

## 2024-08-27 PROCEDURE — 1124F ACP DISCUSS-NO DSCNMKR DOCD: CPT | Performed by: STUDENT IN AN ORGANIZED HEALTH CARE EDUCATION/TRAINING PROGRAM

## 2024-08-27 PROCEDURE — 1159F MED LIST DOCD IN RCRD: CPT | Performed by: STUDENT IN AN ORGANIZED HEALTH CARE EDUCATION/TRAINING PROGRAM

## 2024-08-27 PROCEDURE — G0439 PPPS, SUBSEQ VISIT: HCPCS | Performed by: STUDENT IN AN ORGANIZED HEALTH CARE EDUCATION/TRAINING PROGRAM

## 2024-08-27 PROCEDURE — 3078F DIAST BP <80 MM HG: CPT | Performed by: STUDENT IN AN ORGANIZED HEALTH CARE EDUCATION/TRAINING PROGRAM

## 2024-08-27 PROCEDURE — 3044F HG A1C LEVEL LT 7.0%: CPT | Performed by: STUDENT IN AN ORGANIZED HEALTH CARE EDUCATION/TRAINING PROGRAM

## 2024-08-27 PROCEDURE — 3008F BODY MASS INDEX DOCD: CPT | Performed by: STUDENT IN AN ORGANIZED HEALTH CARE EDUCATION/TRAINING PROGRAM

## 2024-08-27 PROCEDURE — 99214 OFFICE O/P EST MOD 30 MIN: CPT | Performed by: STUDENT IN AN ORGANIZED HEALTH CARE EDUCATION/TRAINING PROGRAM

## 2024-08-27 PROCEDURE — 4010F ACE/ARB THERAPY RXD/TAKEN: CPT | Performed by: STUDENT IN AN ORGANIZED HEALTH CARE EDUCATION/TRAINING PROGRAM

## 2024-08-27 PROCEDURE — 1160F RVW MEDS BY RX/DR IN RCRD: CPT | Performed by: STUDENT IN AN ORGANIZED HEALTH CARE EDUCATION/TRAINING PROGRAM

## 2024-08-27 PROCEDURE — 3048F LDL-C <100 MG/DL: CPT | Performed by: STUDENT IN AN ORGANIZED HEALTH CARE EDUCATION/TRAINING PROGRAM

## 2024-08-27 RX ORDER — DULAGLUTIDE 1.5 MG/.5ML
1.5 INJECTION, SOLUTION SUBCUTANEOUS
Qty: 6 ML | Refills: 3 | Status: SHIPPED | OUTPATIENT
Start: 2024-08-27

## 2024-08-27 RX ORDER — DAPAGLIFLOZIN 10 MG/1
10 TABLET, FILM COATED ORAL EVERY MORNING
Qty: 90 TABLET | Refills: 1 | Status: SHIPPED | OUTPATIENT
Start: 2024-08-27

## 2024-08-27 RX ORDER — METOPROLOL TARTRATE 50 MG/1
50 TABLET ORAL EVERY 12 HOURS
Qty: 180 TABLET | Refills: 3 | Status: SHIPPED | OUTPATIENT
Start: 2024-08-27

## 2024-08-27 RX ORDER — ATORVASTATIN CALCIUM 20 MG/1
20 TABLET, FILM COATED ORAL DAILY
Qty: 90 TABLET | Refills: 3 | Status: SHIPPED | OUTPATIENT
Start: 2024-08-27

## 2024-08-27 RX ORDER — LOSARTAN POTASSIUM 25 MG/1
25 TABLET ORAL DAILY
Qty: 90 TABLET | Refills: 3 | Status: SHIPPED | OUTPATIENT
Start: 2024-08-27

## 2024-08-27 RX ORDER — HYDROCHLOROTHIAZIDE 25 MG/1
25 TABLET ORAL DAILY
Qty: 90 TABLET | Refills: 3 | Status: SHIPPED | OUTPATIENT
Start: 2024-08-27

## 2024-08-27 RX ORDER — NIFEDIPINE 30 MG/1
30 TABLET, FILM COATED, EXTENDED RELEASE ORAL DAILY
Qty: 90 TABLET | Refills: 3 | Status: SHIPPED | OUTPATIENT
Start: 2024-08-27

## 2024-08-27 ASSESSMENT — ACTIVITIES OF DAILY LIVING (ADL)
GROCERY_SHOPPING: NEEDS ASSISTANCE
DOING_HOUSEWORK: TOTAL CARE
DRESSING: INDEPENDENT
TAKING_MEDICATION: TOTAL CARE
BATHING: INDEPENDENT
MANAGING_FINANCES: TOTAL CARE

## 2024-08-27 NOTE — PROGRESS NOTES
Subjective   Reason for Visit: Huber Lindsey is an 74 y.o. male here for a Medicare Wellness visit.     Past Medical, Surgical, and Family History reviewed and updated in chart.    Reviewed all medications by prescribing practitioner or clinical pharmacist (such as prescriptions, OTCs, herbal therapies and supplements) and documented in the medical record.    HPI    74y/o male with HTN , DM2, Hep C s/p completion of Rx, Parkinson'         Patient Care Team:  Chelsea Aguillon MD as PCP - General (Family Medicine)  Chelsea Aguillon MD as PCP - [facility] PCP     Current Outpatient Medications   Medication Instructions    acetaminophen (ACETAMINOPHEN EXTRA STRENGTH) 500 mg, oral, 2 times daily    allopurinol (ZYLOPRIM) 100 mg, oral, Daily    aspirin 81 mg, oral, Daily    atorvastatin (LIPITOR) 20 mg, oral, Daily    carbidopa-levodopa (Sinemet)  mg tablet TAKE 3 TABLETS BY MOUTH THREE TIMES DAILY    cholecalciferol (VITAMIN D3) 1,000 Units, oral, Daily    dapagliflozin propanediol (FARXIGA) 10 mg, oral, Every morning    famotidine (Pepcid) 20 mg tablet 1 tablet, oral, Nightly    fluticasone (Flonase) 50 mcg/actuation nasal spray nasal    hydroCHLOROthiazide (HYDRODIURIL) 25 mg, oral, Daily    losartan (COZAAR) 25 mg, oral, Daily    metoprolol tartrate (LOPRESSOR) 50 mg, oral, Every 12 hours    multivit-iron-minerals-folic acid (Centrum Silver) 0.4 mg-300 mcg- 250 mcg tab 1 tablet, oral, Daily    NIFEdipine ER (ADALAT CC) 30 mg, oral, Daily    sodium zirconium cyclosilicate (Lokelma) 10 gram packet TAKE 1 PACKET ONCE DAILY    Trulicity 1.5 mg, subcutaneous, Once Weekly        Social History     Tobacco Use    Smoking status: Former     Current packs/day: 0.00     Types: Cigarettes     Quit date:      Years since quittin.6    Smokeless tobacco: Never   Substance Use Topics    Alcohol use: Not Currently        Review of Systems  Constitutional: no chills, no fever and no night sweats.  "    Eyes: no blurred vision and no eyesight problems.     ENT: no hearing loss, no nasal congestion, no nasal discharge, no hoarseness and no sore throat.     Cardiovascular: no chest pain, no intermittent leg claudication, no lower extremity edema, no palpitations and no syncope.     Respiratory: no cough, no shortness of breath during exertion, no shortness of breath at rest and no wheezing.     Gastrointestinal: no abdominal pain, no constipation, no blood in stools, no diarrhea, no melena, no nausea, no rectal pain and no vomiting.     Genitourinary: no dysuria, no change in urinary frequency, no urinary hesitancy and no feelings of urinary urgency.     Musculoskeletal: no arthralgias, no back pain and no myalgias.     Integumentary: no new skin lesions and no rashes.     Neurological: no difficulty walking, no headache, no limb weakness, no numbness and no tingling.     Psychiatric: no anxiety, no depression, no anhedonia and no substance use disorders.     Endocrine: no recent weight gain and no recent weight loss.     Hematologic/Lymphatic: no tendency for easy bruising and no swollen glands.          All other systems have been reviewed and are negative for complaint.      Objective   Vitals:  /67   Pulse 87   Ht 1.88 m (6' 2\")   Wt 116 kg (255 lb 12.8 oz)   SpO2 92%   BMI 32.84 kg/m²       Physical Exam  Constitutional: Alert and in no acute distress. Well developed, well nourished.     Eyes: Normal external exam. Pupils were equal in size, round, reactive to light (PERRL) with normal accommodation and extraocular movements intact (EOMI).     Ears, Nose, Mouth, and Throat: External inspection of ears and nose: Normal.  Otoscopic examination: Normal.      Neck: No neck mass was observed. Supple.     Cardiovascular: Heart rate and rhythm were normal, normal S1 and S2, no gallops, no murmurs and no pericardial rub    Pulmonary: No respiratory distress. Clear bilateral breath sounds.     Abdomen: " Soft nontender; no abdominal mass palpated. No organomegaly.     Musculoskeletal: No joint swelling seen, normal movements of all extremities. Range of motion: Normal.  Muscle strength/tone: Normal.        Neurologic: Deep tendon reflexes were 2+ and symmetric. Sensation: Normal.     Psychiatric: Judgment and insight: Intact. Mood and affect: Normal.        Assessment/Plan   Problem List Items Addressed This Visit       Mixed hyperlipidemia    Overview     Formatting of this note might be different from the original.   8-2018 Formatting of this note might be different from the original.   8-2018         Relevant Medications    atorvastatin (Lipitor) 20 mg tablet    HTN (hypertension)    Overview     Formatting of this note might be different from the original. Overview: Continue Amlodipine and lisinopril Hold HCTZ         Relevant Medications    hydroCHLOROthiazide (HYDRODiuril) 25 mg tablet    metoprolol tartrate (Lopressor) 50 mg tablet    NIFEdipine ER (NIFEdipine CC) 30 mg 24 hr tablet    losartan (Cozaar) 25 mg tablet    Gout    Overview     Formatting of this note might be different from the original. Overview: Hx of gout with flare only once in the past. States was recently re-started on HCTZ for BP. Appreciate Rheum input S/p arthrocentesis and no crystal seen on microscopy, synovial fluid culture negative Plan: Continue colchicine Add indomentacin for pain Switch to oral prednisone and taper Formatting of this note might be different from the original. Hx of gout with flare only once in the past. States was recently re-started on HCTZ for BP. Appreciate Rheum input S/p arthrocentesis and no crystal seen on microscopy, synovial fluid culture negative Plan: Continue colchicine Add indomentacin for pain Switch to oral prednisone and taper Overview: Overview: Hx of gout with flare only once in the past. States was recently re-started on HCTZ for BP. Appreciate Rheum input S/p arthrocentesis and no  crystal seen on microscopy, synovial fluid culture negative Plan: Continue colchicine Add indomentacin for pain Switch to oral prednisone and taper         Relevant Orders    Uric Acid     Other Visit Diagnoses       Medicare annual wellness visit, subsequent    -  Primary    Diabetes mellitus without complication (Multi)        Relevant Medications    dapagliflozin propanediol (Farxiga) 10 mg    dulaglutide (Trulicity) 1.5 mg/0.5 mL pen injector injection    Other Relevant Orders    Hemoglobin A1C    Comprehensive Metabolic Panel    Screening for prostate cancer        Relevant Orders    Prostate Specific Antigen, Screen          74y/o male with HTN , DM2, Hep C s/p completion of Rx, Parkinson'     Chronic medical conditions: Reviewed , assessed , stable, meds refilled.   - HTN : at goal   - DM2 : controlled     Concern for poor hygiene.      Conditions addressed and mgmt as noted above.  Pertinent labs, images/ imaging reports , chart review was done .   Age appropriate labs / labs for mgmt of chronic medical conditions ordered, further mgmt pending the results.        Influenza: influenza vaccine in the fall   Pneumovax 23/Prevnar 15: Pneumovax 23/Prevnar 15 vaccine was previously given.   Prevnar 20: Prevnar 20 vaccine was previously given.   Shingles Vaccine: Shingles vaccine was given previously  Prostate cancer screening: Screening ordered  Colorectal Cancer Screening: Sister reported getting the colonoscopy done in the last 10 years, suggested that she call that physician's office and find out when he is due for the next one.  8/27/24 : Cologuard was reportedly received t home but never mailed back with stool sample .   Declined colonoscopy    Abdominal Aortic Aneurysm screening: screening not indicated.         RTO  in 4m     This note is intended for the physician writing it, as well as to communicate findings to other healthcare professionals. These notes use medical lexicon that may be misunderstood by  non medical persons. Therefore, interpretations of medical notes and terminology should be approached with caution.

## 2024-11-14 ENCOUNTER — TELEPHONE (OUTPATIENT)
Dept: PRIMARY CARE | Facility: CLINIC | Age: 74
End: 2024-11-14
Payer: COMMERCIAL

## 2024-11-21 ENCOUNTER — TELEPHONE (OUTPATIENT)
Dept: PRIMARY CARE | Facility: CLINIC | Age: 74
End: 2024-11-21
Payer: COMMERCIAL

## 2024-12-13 ENCOUNTER — LAB (OUTPATIENT)
Dept: LAB | Facility: LAB | Age: 74
End: 2024-12-13
Payer: COMMERCIAL

## 2024-12-13 DIAGNOSIS — E11.9 DIABETES MELLITUS WITHOUT COMPLICATION (MULTI): ICD-10-CM

## 2024-12-13 DIAGNOSIS — Z12.5 SCREENING FOR PROSTATE CANCER: ICD-10-CM

## 2024-12-13 DIAGNOSIS — M10.9 GOUT, UNSPECIFIED CAUSE, UNSPECIFIED CHRONICITY, UNSPECIFIED SITE: ICD-10-CM

## 2024-12-13 LAB
ALBUMIN SERPL BCP-MCNC: 4.4 G/DL (ref 3.4–5)
ALP SERPL-CCNC: 96 U/L (ref 33–136)
ALT SERPL W P-5'-P-CCNC: 5 U/L (ref 10–52)
ANION GAP SERPL CALC-SCNC: 13 MMOL/L (ref 10–20)
AST SERPL W P-5'-P-CCNC: 19 U/L (ref 9–39)
BILIRUB SERPL-MCNC: 1 MG/DL (ref 0–1.2)
BUN SERPL-MCNC: 40 MG/DL (ref 6–23)
CALCIUM SERPL-MCNC: 9.9 MG/DL (ref 8.6–10.6)
CHLORIDE SERPL-SCNC: 100 MMOL/L (ref 98–107)
CO2 SERPL-SCNC: 32 MMOL/L (ref 21–32)
CREAT SERPL-MCNC: 1.27 MG/DL (ref 0.5–1.3)
EGFRCR SERPLBLD CKD-EPI 2021: 60 ML/MIN/1.73M*2
EST. AVERAGE GLUCOSE BLD GHB EST-MCNC: 151 MG/DL
GLUCOSE SERPL-MCNC: 173 MG/DL (ref 74–99)
HBA1C MFR BLD: 6.9 %
POTASSIUM SERPL-SCNC: 4.8 MMOL/L (ref 3.5–5.3)
PROT SERPL-MCNC: 8.2 G/DL (ref 6.4–8.2)
PSA SERPL-MCNC: 0.79 NG/ML
SODIUM SERPL-SCNC: 140 MMOL/L (ref 136–145)
URATE SERPL-MCNC: 6.4 MG/DL (ref 4–7.5)

## 2024-12-13 PROCEDURE — 83036 HEMOGLOBIN GLYCOSYLATED A1C: CPT

## 2024-12-13 PROCEDURE — 36415 COLL VENOUS BLD VENIPUNCTURE: CPT

## 2024-12-13 PROCEDURE — 84550 ASSAY OF BLOOD/URIC ACID: CPT

## 2024-12-13 PROCEDURE — 80053 COMPREHEN METABOLIC PANEL: CPT

## 2024-12-13 PROCEDURE — G0103 PSA SCREENING: HCPCS

## 2024-12-20 ENCOUNTER — APPOINTMENT (OUTPATIENT)
Dept: PRIMARY CARE | Facility: CLINIC | Age: 74
End: 2024-12-20
Payer: COMMERCIAL

## 2024-12-20 VITALS
SYSTOLIC BLOOD PRESSURE: 132 MMHG | HEART RATE: 93 BPM | DIASTOLIC BLOOD PRESSURE: 80 MMHG | BODY MASS INDEX: 34.8 KG/M2 | OXYGEN SATURATION: 93 % | WEIGHT: 271.2 LBS | HEIGHT: 74 IN

## 2024-12-20 DIAGNOSIS — N18.32 TYPE 2 DIABETES MELLITUS WITH STAGE 3B CHRONIC KIDNEY DISEASE, WITHOUT LONG-TERM CURRENT USE OF INSULIN (MULTI): ICD-10-CM

## 2024-12-20 DIAGNOSIS — E11.9 DIABETES MELLITUS WITHOUT COMPLICATION (MULTI): ICD-10-CM

## 2024-12-20 DIAGNOSIS — I10 PRIMARY HYPERTENSION: Primary | ICD-10-CM

## 2024-12-20 DIAGNOSIS — E11.22 TYPE 2 DIABETES MELLITUS WITH STAGE 3B CHRONIC KIDNEY DISEASE, WITHOUT LONG-TERM CURRENT USE OF INSULIN (MULTI): ICD-10-CM

## 2024-12-20 PROCEDURE — 4010F ACE/ARB THERAPY RXD/TAKEN: CPT | Performed by: STUDENT IN AN ORGANIZED HEALTH CARE EDUCATION/TRAINING PROGRAM

## 2024-12-20 PROCEDURE — 3044F HG A1C LEVEL LT 7.0%: CPT | Performed by: STUDENT IN AN ORGANIZED HEALTH CARE EDUCATION/TRAINING PROGRAM

## 2024-12-20 PROCEDURE — 3062F POS MACROALBUMINURIA REV: CPT | Performed by: STUDENT IN AN ORGANIZED HEALTH CARE EDUCATION/TRAINING PROGRAM

## 2024-12-20 PROCEDURE — 3079F DIAST BP 80-89 MM HG: CPT | Performed by: STUDENT IN AN ORGANIZED HEALTH CARE EDUCATION/TRAINING PROGRAM

## 2024-12-20 PROCEDURE — 1159F MED LIST DOCD IN RCRD: CPT | Performed by: STUDENT IN AN ORGANIZED HEALTH CARE EDUCATION/TRAINING PROGRAM

## 2024-12-20 PROCEDURE — 1160F RVW MEDS BY RX/DR IN RCRD: CPT | Performed by: STUDENT IN AN ORGANIZED HEALTH CARE EDUCATION/TRAINING PROGRAM

## 2024-12-20 PROCEDURE — G2211 COMPLEX E/M VISIT ADD ON: HCPCS | Performed by: STUDENT IN AN ORGANIZED HEALTH CARE EDUCATION/TRAINING PROGRAM

## 2024-12-20 PROCEDURE — 3048F LDL-C <100 MG/DL: CPT | Performed by: STUDENT IN AN ORGANIZED HEALTH CARE EDUCATION/TRAINING PROGRAM

## 2024-12-20 PROCEDURE — 3008F BODY MASS INDEX DOCD: CPT | Performed by: STUDENT IN AN ORGANIZED HEALTH CARE EDUCATION/TRAINING PROGRAM

## 2024-12-20 PROCEDURE — 1036F TOBACCO NON-USER: CPT | Performed by: STUDENT IN AN ORGANIZED HEALTH CARE EDUCATION/TRAINING PROGRAM

## 2024-12-20 PROCEDURE — 3075F SYST BP GE 130 - 139MM HG: CPT | Performed by: STUDENT IN AN ORGANIZED HEALTH CARE EDUCATION/TRAINING PROGRAM

## 2024-12-20 PROCEDURE — 99214 OFFICE O/P EST MOD 30 MIN: CPT | Performed by: STUDENT IN AN ORGANIZED HEALTH CARE EDUCATION/TRAINING PROGRAM

## 2024-12-20 RX ORDER — DAPAGLIFLOZIN 10 MG/1
10 TABLET, FILM COATED ORAL EVERY MORNING
Qty: 90 TABLET | Refills: 1 | Status: SHIPPED | OUTPATIENT
Start: 2024-12-20

## 2024-12-20 NOTE — PROGRESS NOTES
"Subjective   Patient ID: Huber Lindsey is a 74 y.o. male who presents for Follow-up (4 month follow-up. ).        HPI      73y/o male with HTN , DM2, Hep C s/p completion of Rx, Parkinsons, ckd stage 3      Is here with her sis  Radha     Labs reviewed  and discussed   Weight gain noted  since last visit , sis reported more PO intake    Has seen wound care               Visit Vitals  /80   Pulse 93   Ht 1.88 m (6' 2\")   Wt 123 kg (271 lb 3.2 oz)   SpO2 93%   BMI 34.82 kg/m²   Smoking Status Former   BSA 2.53 m²      No LMP for male patient.   Current Outpatient Medications   Medication Instructions    acetaminophen (ACETAMINOPHEN EXTRA STRENGTH) 500 mg, 2 times daily    allopurinol (ZYLOPRIM) 100 mg, oral, Daily    aspirin 81 mg, Daily    atorvastatin (LIPITOR) 20 mg, oral, Daily    carbidopa-levodopa (Sinemet)  mg tablet TAKE 3 TABLETS BY MOUTH THREE TIMES DAILY    cholecalciferol (VITAMIN D3) 1,000 Units, Daily    dapagliflozin propanediol (FARXIGA) 10 mg, oral, Every morning    famotidine (Pepcid) 20 mg tablet 1 tablet, Nightly    fluticasone (Flonase) 50 mcg/actuation nasal spray Administer into affected nostril(s).    hydroCHLOROthiazide (HYDRODIURIL) 25 mg, oral, Daily    losartan (COZAAR) 25 mg, oral, Daily    metoprolol tartrate (LOPRESSOR) 50 mg, oral, Every 12 hours    multivit-iron-minerals-folic acid (Centrum Silver) 0.4 mg-300 mcg- 250 mcg tab 1 tablet, Daily    NIFEdipine ER (ADALAT CC) 30 mg, oral, Daily    sodium zirconium cyclosilicate (Lokelma) 10 gram packet TAKE 1 PACKET ONCE DAILY    Trulicity 1.5 mg, subcutaneous, Once Weekly      Social History     Tobacco Use    Smoking status: Former     Current packs/day: 0.00     Types: Cigarettes     Quit date:      Years since quittin.9    Smokeless tobacco: Never   Substance Use Topics    Alcohol use: Not Currently        Review of Systems    Constitutional : No feeling poorly / fevers/ chills / night sweats/ fatigue "   Cardiovascular : No CP /Palpitations/ lower extremity edema / syncope   Respiratory : No Cough /HELLER/Dyspnea at rest   Gastrointestinal : No abd pain / N/V  No bloody stools/ melena / constipation  Endo : No polyuria/polydipsia/ muscle weakness / sluggishness   CNS: No confusion / HA/ tingling/ numbness/ weakness of extremities  Psychiatric: No anxiety/ depression/ SI/HI    All other systems have been reviewed and are negative for complaint       Physical Exam    Constitutional : Vitals reviewed. Alert and in no distress  Cardiovascular : RRR, Normal S1, S2, No pericardial rub/ gallop, no peripheral edema   Pulmonary: No respiratory distress, CTAB   MSK : Normal gait and station , strength and tone   Skin: Warm to touch ,  normal skin turgor   Neurologic : CNs 2-12 grossly intact , no obvious FNDs  Psych : A,Ox3, normal mood and affect      Assessment/Plan   Diagnoses and all orders for this visit:  Primary hypertension  Type 2 diabetes mellitus with stage 3b chronic kidney disease, without long-term current use of insulin (Multi)  Diabetes mellitus without complication (Multi)  -     dapagliflozin propanediol (Farxiga) 10 mg; Take 1 tablet (10 mg) by mouth once daily in the morning.    73y/o male with HTN , DM2, Hep C s/p completion of Rx, Parkinsons, ckd stage 3        Chronic medical conditions: Reviewed , assessed , stable, meds refilled.   - HTN : at goal   - DM2 : controlled       Continue current meds       Conditions addressed and mgmt as noted above.  Pertinent labs, images/ imaging reports , chart review was done .   Age appropriate labs / labs for mgmt of chronic medical conditions ordered, further mgmt pending the results.       This note is intended for the physician writing it, as well as to communicate findings to other healthcare professionals. These notes use medical lexicon that may be misunderstood by non medical persons. Therefore, interpretations of medical notes and terminology should be  approached with caution.

## 2025-01-31 ENCOUNTER — TELEPHONE (OUTPATIENT)
Dept: PRIMARY CARE | Facility: CLINIC | Age: 75
End: 2025-01-31
Payer: COMMERCIAL

## 2025-02-24 ENCOUNTER — TELEPHONE (OUTPATIENT)
Dept: PRIMARY CARE | Facility: CLINIC | Age: 75
End: 2025-02-24
Payer: COMMERCIAL

## 2025-03-05 DIAGNOSIS — E11.9 DIABETES MELLITUS WITHOUT COMPLICATION (MULTI): ICD-10-CM

## 2025-03-07 RX ORDER — DAPAGLIFLOZIN 10 MG/1
10 TABLET, FILM COATED ORAL
Qty: 90 TABLET | Refills: 3 | Status: SHIPPED | OUTPATIENT
Start: 2025-03-07

## 2025-04-08 ENCOUNTER — APPOINTMENT (OUTPATIENT)
Dept: NEUROLOGY | Facility: CLINIC | Age: 75
End: 2025-04-08
Payer: COMMERCIAL

## 2025-04-08 VITALS
BODY MASS INDEX: 36.59 KG/M2 | WEIGHT: 285 LBS | DIASTOLIC BLOOD PRESSURE: 89 MMHG | HEART RATE: 103 BPM | RESPIRATION RATE: 18 BRPM | SYSTOLIC BLOOD PRESSURE: 142 MMHG

## 2025-04-08 DIAGNOSIS — G20.A1 PARKINSON'S DISEASE, UNSPECIFIED WHETHER DYSKINESIA PRESENT, UNSPECIFIED WHETHER MANIFESTATIONS FLUCTUATE: Primary | ICD-10-CM

## 2025-04-08 PROCEDURE — 1159F MED LIST DOCD IN RCRD: CPT | Performed by: PSYCHIATRY & NEUROLOGY

## 2025-04-08 PROCEDURE — 1157F ADVNC CARE PLAN IN RCRD: CPT | Performed by: PSYCHIATRY & NEUROLOGY

## 2025-04-08 PROCEDURE — G2211 COMPLEX E/M VISIT ADD ON: HCPCS | Performed by: PSYCHIATRY & NEUROLOGY

## 2025-04-08 PROCEDURE — 3079F DIAST BP 80-89 MM HG: CPT | Performed by: PSYCHIATRY & NEUROLOGY

## 2025-04-08 PROCEDURE — 1036F TOBACCO NON-USER: CPT | Performed by: PSYCHIATRY & NEUROLOGY

## 2025-04-08 PROCEDURE — 3075F SYST BP GE 130 - 139MM HG: CPT | Performed by: PSYCHIATRY & NEUROLOGY

## 2025-04-08 PROCEDURE — 99213 OFFICE O/P EST LOW 20 MIN: CPT | Performed by: PSYCHIATRY & NEUROLOGY

## 2025-04-08 PROCEDURE — 4010F ACE/ARB THERAPY RXD/TAKEN: CPT | Performed by: PSYCHIATRY & NEUROLOGY

## 2025-04-08 RX ORDER — CARBIDOPA AND LEVODOPA 25; 100 MG/1; MG/1
TABLET ORAL
Qty: 810 TABLET | Refills: 3 | Status: SHIPPED | OUTPATIENT
Start: 2025-04-08

## 2025-04-08 ASSESSMENT — UNIFIED PARKINSONS DISEASE RATING SCALE (UPDRS)
HANDMOVEMENTS_RIGHT: 0
RIGIDITY_LUE: 2
AMPLITUDE_RLE: 0
LEVODOPA: YES
FINGER_TAPPING_LEFT: 1
TOETAPPING_LEFT: 0
CLINICAL_STATE: ON
LEG_AGILITY_RIGHT: 0
MINUTES_SINCE_LEVODOPA: 60
CHAIR_RISING_SCALE: 2
SPEECH: 0
POSTURAL_TREMOR_LEFTHAND: 1
RIGIDITY_LLE: 1
AMPLITUDE_RUE: 1
GAIT: 1
DYSKINESIAS_PRESENT: NO
KINETIC_TREMOR_RIGHTHAND: 0
TOETAPPING_RIGHT: 0
KINETIC_TREMOR_LEFTHAND: 0
FREEZING_GAIT: 0
AMPLITUDE_LLE: 0
PARKINSONS_MEDS: YES
FINGER_TAPPING_RIGHT: 0
TOTAL_SCORE: 20
POSTURE: 1
PRONATION_SUPINATION_LEFT: 0
AMPLITUDE_LIP_JAW: 0
RIGIDITY_RLE: 0
SPONTANEITY_OF_MOVEMENT: 1
LEG_AGILITY_LEFT: 0
RIGIDITY_RUE: 0
RIGIDITY_NECK: 0
AMPLITUDE_LUE: 2
PRONATION_SUPINATION_RIGHT: 0
POSTURAL_STABILITY: 3
CONSTANCY_TREMOR_ATREST: 2
POSTURAL_TREMOR_RIGHTHAND: 0
FACIAL_EXPRESSION: 1

## 2025-04-08 ASSESSMENT — PATIENT HEALTH QUESTIONNAIRE - PHQ9
2. FEELING DOWN, DEPRESSED OR HOPELESS: NOT AT ALL
SUM OF ALL RESPONSES TO PHQ9 QUESTIONS 1 AND 2: 0
1. LITTLE INTEREST OR PLEASURE IN DOING THINGS: NOT AT ALL

## 2025-04-08 ASSESSMENT — ENCOUNTER SYMPTOMS
DEPRESSION: 0
OCCASIONAL FEELINGS OF UNSTEADINESS: 0
LOSS OF SENSATION IN FEET: 0

## 2025-04-08 NOTE — PATIENT INSTRUCTIONS
Pleasure meeting you today. Good to see that your symptoms have not progressed and that you continue to do well. The plan is as follows:    Plan  - Continue Sinemet (Carbidopa/levodopa) 3 tablets 3 times a day  - We sent a refill to your pharmacy  - We will place a referral for home physical therapy through a company called [employer]. They will call to schedule.  - Call us if you would like to adjust your medications  - Come back to see us in 6 months.

## 2025-04-08 NOTE — PROGRESS NOTES
[facility] MOVEMENT DISORDERS OUTPATIENT FOLLOW-UP NOTE  Subjective   Patient Overview  Huber Lidnsey is a 74 y.o. right handed male with a history notable for HTN, NID-T2DM, and CKD3a who presents to the neurology clinic for followup of tremor. The patient has carried the diagnosis of slowly progressive idiopathic Parkinson's Disease (iPD).    Previous Neurologic History  Briefly, the patient was first seen in neurology clinic in February of 2018. He had been sitting on his couch when he reportedly had sudden inability to move his body. Was brought to the ED were workup was negative. States he was not paralyzed; he could still move but it was just incredibly hard to get his muscles moving. Was also developing a LUE rest tremor. On establishing with neuro, he was started on C/L with a near-miraculous resolution of his symptoms (only occasional L rest tremor remaining) which has been mild since that time. At his last visit, he was continued on C/L  3 tabs TID.    Interval History  Presents with sister margarita for 10 month follow up. Patient reports he is doing well. No changes since he was last here. Remains on C/L 25/100 mg 3 tabs TID.    Sleep is not  great because he is not active and naps during the day. He does not exercise but plans to start walking the dog when the weather improves. Denies any fall, has to be careful when walking and uses the Rolator every time he leaves his apartment. He wears compression socks for edema in legs. He is incontinent ands wears briefs for several years. Denies any sign of OH. Denies any memory concerns. Denies any motor fluctuation. Has some tremor but reports it is not bothersome.      ROS:  A comprehensive review-of-systems was obtained and negative unless noted above in the HPI.    Past Medical History  Past Medical History:   Diagnosis Date    Age-related nuclear cataract, left eye 02/19/2019    Age-related nuclear cataract of left eye    Age-related  nuclear cataract, right eye 02/19/2019    Age-related nuclear cataract of right eye    Chronic viral hepatitis C (Multi) 03/02/2022    Chronic hepatitis C    Cortical age-related cataract, right eye 02/19/2019    Cortical age-related cataract of right eye    Encounter for follow-up examination after completed treatment for conditions other than malignant neoplasm 12/17/2019    Hospital discharge follow-up    Essential (primary) hypertension     Benign essential hypertension    Essential (primary) hypertension 03/02/2022    Essential hypertension    Parkinson's disease 05/10/2022    Parkinsons disease    Personal history of other diseases of the circulatory system 12/04/2019    History of coronary artery disease    Personal history of other diseases of the nervous system and sense organs 01/15/2020    History of peripheral neuropathy    Personal history of other endocrine, nutritional and metabolic disease 10/11/2022    History of hyperkalemia    Personal history of other specified conditions 01/15/2020    History of tachycardia    Personal history of urinary (tract) infections 01/15/2020    History of urinary tract infection    Polyosteoarthritis, unspecified 01/18/2018    Generalized osteoarthrosis, involving multiple sites    Posterior subcapsular polar age-related cataract, right eye 02/19/2019    Posterior subcapsular polar age-related cataract of right eye    Type 2 diabetes mellitus with mild nonproliferative diabetic retinopathy without macular edema, unspecified eye 02/19/2019    NPDR (nonproliferative diabetic retinopathy)    Type 2 diabetes mellitus without complications 02/19/2019    Diabetes mellitus    Type 2 diabetes mellitus without complications 09/11/2021    Type 2 diabetes mellitus    Unspecified cirrhosis of liver (Multi) 08/25/2022    Liver cirrhosis    Unspecified visual loss 01/18/2018    Vision impairment     Surgical History  Past Surgical History:   Procedure Laterality Date    MR HEAD ANGIO  WO IV CONTRAST  2013    MR HEAD ANGIO WO IV CONTRAST 2013 [facility]GAC[facility]    MR NECK ANGIO WO IV CONTRAST  2013    MR NECK ANGIO WO IV CONTRAST 2013 [facility] CLINICAL LEGAC[facility]    OTHER SURGICAL HISTORY  2018    Biopsy Of Liver    RETINAL DETACHMENT SURGERY  2018    Repair Of Retinal Detachment    US GUIDED NEEDLE LIVER BIOPSY  3/27/2018    US GUIDED NEEDLE LIVER BIOPSY 3/27/2018 [facility]     Social History  Social History     Tobacco Use    Smoking status: Former     Current packs/day: 0.00     Types: Cigarettes     Quit date:      Years since quittin.2    Smokeless tobacco: Never   Vaping Use    Vaping status: Never Used   Substance Use Topics    Alcohol use: Not Currently    Drug use: Not Currently     Allergies  Metformin, Naproxen, and Nut - unspecified      =========  Medications    Current Outpatient Medications:     acetaminophen (Acetaminophen Extra Strength) 500 mg tablet, Take 1 tablet (500 mg) by mouth 2 times a day., Disp: , Rfl:     allopurinol (Zyloprim) 100 mg tablet, Take 1 tablet by mouth once daily, Disp: 90 tablet, Rfl: 3    aspirin 81 mg EC tablet, Take 1 tablet (81 mg) by mouth once daily., Disp: , Rfl:     atorvastatin (Lipitor) 20 mg tablet, Take 1 tablet (20 mg) by mouth once daily., Disp: 90 tablet, Rfl: 3    carbidopa-levodopa (Sinemet)  mg tablet, TAKE 3 TABLETS BY MOUTH THREE TIMES DAILY, Disp: 810 tablet, Rfl: 3    cholecalciferol (Vitamin D3) 25 MCG (1000 UT) tablet, Take 1 tablet (1,000 Units) by mouth once daily., Disp: , Rfl:     dapagliflozin propanediol (Farxiga) 10 mg, Take 1 tablet (10 mg) by mouth once daily in the morning. Take before meals., Disp: 90 tablet, Rfl: 3    dulaglutide (Trulicity) 1.5 mg/0.5 mL pen injector injection, Inject 1.5 mg under the skin 1 (one) time per week., Disp: 6 mL, Rfl: 3    famotidine (Pepcid) 20 mg tablet, Take 1 tablet (20 mg) by mouth once daily at bedtime., Disp: , Rfl:     fluticasone  (Flonase) 50 mcg/actuation nasal spray, Administer into affected nostril(s)., Disp: , Rfl:     hydroCHLOROthiazide (HYDRODiuril) 25 mg tablet, Take 1 tablet (25 mg) by mouth once daily., Disp: 90 tablet, Rfl: 3    losartan (Cozaar) 25 mg tablet, Take 1 tablet (25 mg) by mouth once daily., Disp: 90 tablet, Rfl: 3    metoprolol tartrate (Lopressor) 50 mg tablet, Take 1 tablet by mouth every 12 hours., Disp: 180 tablet, Rfl: 3    multivit-iron-minerals-folic acid (Centrum Silver) 0.4 mg-300 mcg- 250 mcg tab, Take 1 tablet by mouth once daily., Disp: , Rfl:     NIFEdipine ER (NIFEdipine CC) 30 mg 24 hr tablet, Take 1 tablet (30 mg) by mouth once daily., Disp: 90 tablet, Rfl: 3    sodium zirconium cyclosilicate (Lokelma) 10 gram packet, TAKE 1 PACKET ONCE DAILY, Disp: 90 packet, Rfl: 3     =========      Objective   Vital Signs  /89 (BP Location: Left arm, Patient Position: Standing, BP Cuff Size: Large adult)   Pulse 103   Resp 18   Wt 129 kg (285 lb)   BMI 36.59 kg/m²     Physical Exam  EOMI  Face symmetrical   Sensation to LT intact  Strength 5/5 in upper extremities and 4+ in bilateral proximal LE muscles     MDS UPDRS 1st Score: Motor Examination  Is the patient on medication for treating the symptoms of Parkinson's Disease?: Yes  Patients receiving medication for treating the symptoms of Parkinson's Disease, anne marie the patient's clinical state.: On  Is the patient on Levodopa?: Yes  Minutes since last Levodopa dose: 60  Speech: 0  Facial Expression: 1  Rigidty Neck: 0  Rigidty RUE: 0  Rigidity - LUE: 2  Rigidity RLE: 0  Rigidity LLE: 1  Finger Tapping Right Hand: 0  Finger Tapping Left Hand: 1  Hand Movements- Right Hand: 0  Hand Movements- Left Hand: 1  Pronatiaon-Supination Movments - Right Hand: 0  Pronatiaon-Supination Movments Left Hand: 0  Toe Tapping Right Foot: 0  Toe Tapping - Left Foot: 0  Leg Agility - Right Le  Leg Agility - Left le  Arising from Chair: 2  Gait: 1  Freezing of Gait:  0  Postural Stability: 3 (By observation)  Posture: 1  Global Spontanteity of Movment ( Body Bradykinesia): 1  Postural Tremor - Right Hand: 0  Postural Tremor - Left hand: 1  Kinetic Tremor - Right hand: 0  Kinetic Tremor - Left hand: 0  Rest Tremor Amplitude - RUE: 1  Rest Tremor Amplitude - LUE: 2  Rest Tremor Amplitude - RLE: 0  Rest Tremor Amplitude - LLE: 0  Rest Tremor Amplitude - Lip/Jaw: 0  Constancy of Rest Tremor: 2  MDS UPDRS Total Score: 20  Were dyskinesias (chorea or dystonia) present during examination?: No     Recent/Relevant Labs:  Hemoglobin   Date/Time Value Ref Range Status   06/14/2024 10:46 AM 13.3 (L) 13.5 - 17.5 g/dL Final     WBC   Date/Time Value Ref Range Status   06/14/2024 10:46 AM 10.0 4.4 - 11.3 x10*3/uL Final     Sodium   Date/Time Value Ref Range Status   12/13/2024 01:14  136 - 145 mmol/L Final     Potassium   Date/Time Value Ref Range Status   12/13/2024 01:14 PM 4.8 3.5 - 5.3 mmol/L Final     Chloride   Date/Time Value Ref Range Status   12/13/2024 01:14  98 - 107 mmol/L Final     Urea Nitrogen   Date/Time Value Ref Range Status   12/13/2024 01:14 PM 40 (H) 6 - 23 mg/dL Final     Creatinine   Date/Time Value Ref Range Status   12/13/2024 01:14 PM 1.27 0.50 - 1.30 mg/dL Final     Magnesium   Date/Time Value Ref Range Status   07/10/2020 12:15 AM 2.37 1.60 - 2.40 mg/dL Final     Phosphorus   Date/Time Value Ref Range Status   06/14/2024 10:46 AM 3.5 2.5 - 4.9 mg/dL Final     Comment:     The performance characteristics of phosphorus testing in heparinized plasma have been validated by the individual  laboratory site where testing is performed. Testing on heparinized plasma is not approved by the FDA; however, such approval is not necessary.     Calcium   Date/Time Value Ref Range Status   12/13/2024 01:14 PM 9.9 8.6 - 10.6 mg/dL Final     Total Protein   Date/Time Value Ref Range Status   12/13/2024 01:14 PM 8.2 6.4 - 8.2 g/dL Final     Albumin   Date/Time Value Ref  Range Status   12/13/2024 01:14 PM 4.4 3.4 - 5.0 g/dL Final     Bilirubin, Total   Date/Time Value Ref Range Status   12/13/2024 01:14 PM 1.0 0.0 - 1.2 mg/dL Final     Bilirubin, Direct   Date/Time Value Ref Range Status   07/10/2020 12:15 AM 0.1 0.0 - 0.3 mg/dL Final     AST   Date/Time Value Ref Range Status   12/13/2024 01:14 PM 19 9 - 39 U/L Final     ALT   Date/Time Value Ref Range Status   12/13/2024 01:14 PM 5 (L) 10 - 52 U/L Final     Comment:     Patients treated with Sulfasalazine may generate falsely decreased results for ALT.     Alkaline Phosphatase   Date/Time Value Ref Range Status   12/13/2024 01:14 PM 96 33 - 136 U/L Final     Total CK   Date/Time Value Ref Range Status   11/25/2019 06:10  0 - 325 U/L Final     Lactate   Date/Time Value Ref Range Status   11/24/2019 08:00 PM 0.9 0.4 - 2.0 mmol/L Final     Comment:      Venipuncture immediately after or during the    administration of Metamizole may lead to falsely   low results. Testing should be performed immediately   prior to Metamizole dosing.        LDL   Date/Time Value Ref Range Status   04/29/2023 10:35 AM 42 0 - 99 mg/dL Final     Comment:     .                           NEAR      BORD      AGE      DESIRABLE  OPTIMAL    HIGH     HIGH     VERY HIGH     0-19 Y     0 - 109     ---    110-129   >/= 130     ----    20-24 Y     0 - 119     ---    120-159   >/= 160     ----      >24 Y     0 -  99   100-129  130-159   160-189     >/=190  .     02/23/2022 12:23 PM 57 0 - 99 mg/dL Final     Comment:     .                           NEAR      BORD      AGE      DESIRABLE  OPTIMAL    HIGH     HIGH     VERY HIGH     0-19 Y     0 - 109     ---    110-129   >/= 130     ----    20-24 Y     0 - 119     ---    120-159   >/= 160     ----      >24 Y     0 -  99   100-129  130-159   160-189     >/=190  .     02/22/2021 12:00 AM 91 0 - 99 mg/dL Final     Comment:     .                           NEAR      BORD      AGE      DESIRABLE  OPTIMAL    HIGH      HIGH     VERY HIGH     0-19 Y     0 - 109     ---    110-129   >/= 130     ----    20-24 Y     0 - 119     ---    120-159   >/= 160     ----      >24 Y     0 -  99   100-129  130-159   160-189     >/=190  .       Triglycerides   Date/Time Value Ref Range Status   05/20/2024 11:55  (H) 0 - 149 mg/dL Final     Comment:        Age         Desirable   Borderline High   High     Very High   0 D-90 D    19 - 174         ----         ----        ----  91 D- 9 Y     0 -  74        75 -  99     >/= 100      ----    10-19 Y     0 -  89        90 - 129     >/= 130      ----    20-24 Y     0 - 114       115 - 149     >/= 150      ----         >24 Y     0 - 149       150 - 199    200- 499    >/= 500    Venipuncture immediately after or during the administration of Metamizole may lead to falsely low results. Testing should be performed immediately prior to Metamizole dosing.     TR HGBA1C (Data Conversion)   Date/Time Value Ref Range Status   02/20/2018 11:57 AM 7.1 (H) 4.2 - 6.5 % Final     Hemoglobin A1C   Date/Time Value Ref Range Status   12/13/2024 01:14 PM 6.9 (H) See comment % Final   08/19/2024 04:16 PM 6.8 (H) see below % Final   05/20/2024 11:55 AM 7.3 (H) see below % Final     Estimated Average Glucose   Date/Time Value Ref Range Status   12/13/2024 01:14  Not Established mg/dL Final   04/17/2024 04:35  mg/dL Final     Comment:     eAG: (Estimated average glucose) is a calculated value from HgbA1c and is representative of the average blood glucose level in the last 2-3 month period.     BNP   Date/Time Value Ref Range Status   01/12/2020 05:48 AM 24 0 - 99 pg/mL Final     Comment:     .  <100 pg/mL - Heart failure unlikely  100-299 pg/mL - Intermediate probability of acute heart  .               failure exacerbation. Correlate with clinical  .               context and patient history.    >=300 pg/mL - Heart Failure likely. Correlate with clinical  .               context and patient history.  BNP  testing is performed using different testing   methodology at [facility] than at other   [facility]. Direct result comparisons should   only be made within the same method.       TSH   Date/Time Value Ref Range Status   04/29/2023 10:35 AM 2.43 0.44 - 3.98 mIU/L Final     Comment:      TSH testing is performed using different testing    methodology at [facility] than at other    [facility]. Direct result comparisons should    only be made within the same method.     02/23/2022 12:23 PM 1.85 0.44 - 3.98 mIU/L Final     Comment:      TSH testing is performed using different testing    methodology at [facility] than at other    [facility]. Direct result comparisons should    only be made within the same method.     02/22/2021 12:00 AM 3.36 0.44 - 3.98 mIU/L Final     Comment:      TSH testing is performed using different testing    methodology at [facility] than at other    [facility]. Direct result comparisons should    only be made within the same method.       Thyroid Stimulating Hormone   Date/Time Value Ref Range Status   05/20/2024 11:55 AM 1.86 0.44 - 3.98 mIU/L Final     Vitamin B12   Date/Time Value Ref Range Status   06/14/2024 10:46  211 - 911 pg/mL Final     Vitamin B-12   Date/Time Value Ref Range Status   02/23/2022 12:23  211 - 911 pg/mL Final   02/22/2021 12:00  211 - 911 pg/mL Final     Folate   Date/Time Value Ref Range Status   02/22/2021 12:00 AM 7.9 >5.0 ng/mL Final     Comment:     Low           <3.4  Borderline 3.4-5.0  Normal        >5.0  .   Biotin interference may cause falsely elevated results.    Patients taking a Biotin dose of up to 5 mg/day should    refrain from taking Biotin for 24 hours before sample    collection. Providers may contact their local laboratory   for further information.       Vitamin D, 25-Hydroxy, Total   Date/Time Value Ref Range Status   06/14/2024 10:46 AM 35 30 - 100  ng/mL Final     SPE Interpretation   Date/Time Value Ref Range Status   08/02/2023 02:34 PM ABNORMAL  Final     Comment:     Increase in polyclonal gamma globulins.       Recent/Relevant Imaging:  No MRI head results found for the past 12 months   CT head wo IV contrast    Result Date: 4/9/2024  * * *Final Report* * * DATE OF EXAM: Apr 8 2024 11:41PM   [facility]   [id number]  -  CT BRAIN WO IVCON   / ACCESSION #  [id number] PROCEDURE REASON: Transient ischemic attack (TIA)      * * * * Physician Interpretation * * * * RESULT: EXAMINATION: CT BRAIN WO IVCON CLINICAL HISTORY: Confusion. TECHNIQUE:  Serial axial images without IV contrast were obtained from the vertex to the foramen magnum. MQ:  CTBWO_3 CT Radiation dose: Integrated Dose-Length Product (DLP) for this visit =   1042  mGy*cm CT Dose Reduction Employed: No dose reduction techniques were required COMPARISON: None. RESULT: Exam is moderately degraded by motion artifact. Post-operative change: Prior RIGHT orbital scleral banding and placement of a RIGHT ex-press device. Acute change: No evidence of an acute infarct or other acute parenchymal process. Hemorrhage: No evidence of acute intracranial hemorrhage. ECASS hemorrhagic transformation score: Not Applicable Mass Lesion / Mass Effect: There is no evidence of an intracranial mass or extraaxial fluid collection.  No significant mass effect. Chronic change: Patchy foci of  low attenuation coefficient are present within the supratentorial white matter which is a nonspecific finding but likely represents moderate microvascular ischemia. Parenchyma: There is moderate generalized volume loss. Ventricles: The ventricles are within normal limits of size and configuration for age. Paranasal sinuses and skull base: The visualized paranasal sinuses are grossly clear.   The skull base and imaged soft tissues are unremarkable.  Debris is present in the LEFT external auditory canal, presumably cerumen..  (topogram) images: No  additional findings.    IMPRESSION: Exam is moderately degraded by motion artifact.  No acute intracranial abnormality identified within this limitation. Transcribed Using Voice Recognition Transcribe Date/Time: Apr 8 2024 11:55P Dictated by: FLOYD BLUE MD This examination was interpreted and the report reviewed and electronically signed by: FLOYD BLUE MD on Apr 8 2024 11:59PM  EST      Other Recent/Relevant Studies:  No EMG results found for the past 12 months   No EEG results found for the past 12 months   No echocardiogram results found for the past 12 months  No results found. However, due to the size of the patient record, not all encounters were searched. Please check Results Review for a complete set of results.     =========  Assessment/Plan   Assessment:  Huber Lindsey is a 74 y.o. right handed male with a history notable for HTN, NID-T2DM, and CKD3a who presents to the neurology clinic for followup of tremor. The patient has carried the diagnosis of slowly progressive idiopathic Parkinson's Disease (iPD). After an initial onset of severe bradykinesia and LUE rest tremor, the symptoms massively improved with C/L which has stayed at a stable dose since that time. Exam continues to show LUE bradykinesia, LUE>RUE rest tremor (activated most with ambulation), LUE rigidity and unsteady gait. Patient and his sister report that this has been his baseline for a longtime and would like to keep all medications the same. Does have b/l LE weakness which reportedly is unchanged since hospitalization for UTI. Patient does not exercise and is very sedentary. The following plan was discussed:    Impression: Tremor-Predominant Idiopathic Parkinson's Disease    Plan:  - Continue Sinemet (Carbidopa/levodopa) 3 tablets 3 times a day  - We sent a refill to your pharmacy  - We will place a referral for home physical therapy through a company called [employer]. They will call to schedule.  - Call us if you would like to  adjust your medications  - Come back to see us in 6 months.

## 2025-04-15 ENCOUNTER — TELEPHONE (OUTPATIENT)
Dept: HOME HEALTH SERVICES | Facility: HOME HEALTH | Age: 75
End: 2025-04-15
Payer: COMMERCIAL

## 2025-04-15 ENCOUNTER — DOCUMENTATION (OUTPATIENT)
Dept: HOME HEALTH SERVICES | Facility: HOME HEALTH | Age: 75
End: 2025-04-15
Payer: COMMERCIAL

## 2025-04-15 NOTE — HH CARE COORDINATION
Home Care received a referral for Physical Therapy and Occupational Therapy. Unfortunately, we are unable to accept and process the referral at this time.    Reason:  Inability to accommodate the patient's needs in a safe and timely manner    Patients, please reach out to the referring provider or your PCP to assist in obtaining an alternative home care agency and/or guidance to meet your needs.    Providers, please reach out to  Home Care at [phone] with any questions regarding the declined referral.

## 2025-05-05 ENCOUNTER — TELEPHONE (OUTPATIENT)
Dept: PRIMARY CARE | Facility: CLINIC | Age: 75
End: 2025-05-05
Payer: COMMERCIAL

## 2025-06-22 DIAGNOSIS — E79.0 HYPERURICEMIA: ICD-10-CM

## 2025-06-22 DIAGNOSIS — M1A.9XX0 CHRONIC GOUT WITHOUT TOPHUS, UNSPECIFIED CAUSE, UNSPECIFIED SITE: ICD-10-CM

## 2025-06-23 RX ORDER — ALLOPURINOL 100 MG/1
100 TABLET ORAL DAILY
Qty: 90 TABLET | Refills: 0 | Status: SHIPPED | OUTPATIENT
Start: 2025-06-23

## 2025-06-25 ENCOUNTER — TELEPHONE (OUTPATIENT)
Dept: PRIMARY CARE | Facility: CLINIC | Age: 75
End: 2025-06-25
Payer: COMMERCIAL

## 2025-06-27 ENCOUNTER — APPOINTMENT (OUTPATIENT)
Dept: PRIMARY CARE | Facility: CLINIC | Age: 75
End: 2025-06-27
Payer: COMMERCIAL

## 2025-07-08 ENCOUNTER — APPOINTMENT (OUTPATIENT)
Dept: PRIMARY CARE | Facility: CLINIC | Age: 75
End: 2025-07-08
Payer: COMMERCIAL

## 2025-07-08 VITALS
DIASTOLIC BLOOD PRESSURE: 78 MMHG | HEART RATE: 85 BPM | HEIGHT: 74 IN | SYSTOLIC BLOOD PRESSURE: 117 MMHG | BODY MASS INDEX: 37.47 KG/M2 | WEIGHT: 292 LBS | OXYGEN SATURATION: 93 %

## 2025-07-08 DIAGNOSIS — E11.42 DIABETIC PERIPHERAL NEUROPATHY (MULTI): ICD-10-CM

## 2025-07-08 DIAGNOSIS — Z00.00 MEDICARE ANNUAL WELLNESS VISIT, SUBSEQUENT: Primary | ICD-10-CM

## 2025-07-08 DIAGNOSIS — N39.498 OTHER URINARY INCONTINENCE: ICD-10-CM

## 2025-07-08 DIAGNOSIS — N18.32 TYPE 2 DIABETES MELLITUS WITH STAGE 3B CHRONIC KIDNEY DISEASE, WITHOUT LONG-TERM CURRENT USE OF INSULIN (MULTI): ICD-10-CM

## 2025-07-08 DIAGNOSIS — E78.2 MIXED HYPERLIPIDEMIA: ICD-10-CM

## 2025-07-08 DIAGNOSIS — E11.22 TYPE 2 DIABETES MELLITUS WITH STAGE 3B CHRONIC KIDNEY DISEASE, WITHOUT LONG-TERM CURRENT USE OF INSULIN (MULTI): ICD-10-CM

## 2025-07-08 DIAGNOSIS — E11.51 TYPE 2 DIABETES MELLITUS WITH DIABETIC PERIPHERAL ANGIOPATHY WITHOUT GANGRENE, WITHOUT LONG-TERM CURRENT USE OF INSULIN (MULTI): ICD-10-CM

## 2025-07-08 DIAGNOSIS — K74.69 OTHER CIRRHOSIS OF LIVER: ICD-10-CM

## 2025-07-08 DIAGNOSIS — E66.01 OBESITY, MORBID (MULTI): ICD-10-CM

## 2025-07-08 DIAGNOSIS — I10 PRIMARY HYPERTENSION: ICD-10-CM

## 2025-07-08 DIAGNOSIS — E79.0 HYPERURICEMIA: ICD-10-CM

## 2025-07-08 DIAGNOSIS — E11.9 DIABETES MELLITUS WITHOUT COMPLICATION: ICD-10-CM

## 2025-07-08 DIAGNOSIS — M1A.9XX0 CHRONIC GOUT WITHOUT TOPHUS, UNSPECIFIED CAUSE, UNSPECIFIED SITE: ICD-10-CM

## 2025-07-08 DIAGNOSIS — Z12.11 SCREEN FOR COLON CANCER: ICD-10-CM

## 2025-07-08 PROBLEM — E11.29 TYPE II OR UNSPECIFIED TYPE DIABETES MELLITUS WITH RENAL MANIFESTATIONS, UNCONTROLLED(250.42) (MULTI): Status: RESOLVED | Noted: 2024-04-29 | Resolved: 2025-07-08

## 2025-07-08 PROBLEM — E11.65 TYPE II OR UNSPECIFIED TYPE DIABETES MELLITUS WITH RENAL MANIFESTATIONS, UNCONTROLLED(250.42) (MULTI): Status: RESOLVED | Noted: 2024-04-29 | Resolved: 2025-07-08

## 2025-07-08 PROBLEM — K74.60 LIVER CIRRHOSIS (MULTI): Status: RESOLVED | Noted: 2023-05-24 | Resolved: 2025-07-08

## 2025-07-08 PROCEDURE — 1036F TOBACCO NON-USER: CPT | Performed by: STUDENT IN AN ORGANIZED HEALTH CARE EDUCATION/TRAINING PROGRAM

## 2025-07-08 PROCEDURE — 4010F ACE/ARB THERAPY RXD/TAKEN: CPT | Performed by: STUDENT IN AN ORGANIZED HEALTH CARE EDUCATION/TRAINING PROGRAM

## 2025-07-08 PROCEDURE — 1158F ADVNC CARE PLAN TLK DOCD: CPT | Performed by: STUDENT IN AN ORGANIZED HEALTH CARE EDUCATION/TRAINING PROGRAM

## 2025-07-08 PROCEDURE — 3078F DIAST BP <80 MM HG: CPT | Performed by: STUDENT IN AN ORGANIZED HEALTH CARE EDUCATION/TRAINING PROGRAM

## 2025-07-08 PROCEDURE — 1170F FXNL STATUS ASSESSED: CPT | Performed by: STUDENT IN AN ORGANIZED HEALTH CARE EDUCATION/TRAINING PROGRAM

## 2025-07-08 PROCEDURE — 1159F MED LIST DOCD IN RCRD: CPT | Performed by: STUDENT IN AN ORGANIZED HEALTH CARE EDUCATION/TRAINING PROGRAM

## 2025-07-08 PROCEDURE — G0439 PPPS, SUBSEQ VISIT: HCPCS | Performed by: STUDENT IN AN ORGANIZED HEALTH CARE EDUCATION/TRAINING PROGRAM

## 2025-07-08 PROCEDURE — 1123F ACP DISCUSS/DSCN MKR DOCD: CPT | Performed by: STUDENT IN AN ORGANIZED HEALTH CARE EDUCATION/TRAINING PROGRAM

## 2025-07-08 PROCEDURE — 99214 OFFICE O/P EST MOD 30 MIN: CPT | Performed by: STUDENT IN AN ORGANIZED HEALTH CARE EDUCATION/TRAINING PROGRAM

## 2025-07-08 PROCEDURE — 3074F SYST BP LT 130 MM HG: CPT | Performed by: STUDENT IN AN ORGANIZED HEALTH CARE EDUCATION/TRAINING PROGRAM

## 2025-07-08 PROCEDURE — 3008F BODY MASS INDEX DOCD: CPT | Performed by: STUDENT IN AN ORGANIZED HEALTH CARE EDUCATION/TRAINING PROGRAM

## 2025-07-08 PROCEDURE — 1160F RVW MEDS BY RX/DR IN RCRD: CPT | Performed by: STUDENT IN AN ORGANIZED HEALTH CARE EDUCATION/TRAINING PROGRAM

## 2025-07-08 RX ORDER — NIFEDIPINE 30 MG/1
30 TABLET, FILM COATED, EXTENDED RELEASE ORAL DAILY
Qty: 90 TABLET | Refills: 3 | Status: SHIPPED | OUTPATIENT
Start: 2025-07-08

## 2025-07-08 RX ORDER — OXYBUTYNIN CHLORIDE 10 MG/1
10 TABLET, EXTENDED RELEASE ORAL DAILY
Qty: 30 TABLET | Refills: 1 | Status: SHIPPED | OUTPATIENT
Start: 2025-07-08 | End: 2026-07-08

## 2025-07-08 RX ORDER — ALLOPURINOL 100 MG/1
100 TABLET ORAL DAILY
Qty: 90 TABLET | Refills: 3 | Status: SHIPPED | OUTPATIENT
Start: 2025-07-08

## 2025-07-08 RX ORDER — DULAGLUTIDE 1.5 MG/.5ML
1.5 INJECTION, SOLUTION SUBCUTANEOUS
Qty: 6 ML | Refills: 3 | Status: SHIPPED | OUTPATIENT
Start: 2025-07-08 | End: 2025-07-09 | Stop reason: ALTCHOICE

## 2025-07-08 RX ORDER — HYDROCHLOROTHIAZIDE 25 MG/1
25 TABLET ORAL DAILY
Qty: 90 TABLET | Refills: 3 | Status: SHIPPED | OUTPATIENT
Start: 2025-07-08

## 2025-07-08 RX ORDER — LOSARTAN POTASSIUM 25 MG/1
25 TABLET ORAL DAILY
Qty: 90 TABLET | Refills: 3 | Status: SHIPPED | OUTPATIENT
Start: 2025-07-08

## 2025-07-08 RX ORDER — METOPROLOL TARTRATE 50 MG/1
50 TABLET ORAL EVERY 12 HOURS
Qty: 180 TABLET | Refills: 3 | Status: SHIPPED | OUTPATIENT
Start: 2025-07-08

## 2025-07-08 RX ORDER — ATORVASTATIN CALCIUM 20 MG/1
20 TABLET, FILM COATED ORAL DAILY
Qty: 90 TABLET | Refills: 3 | Status: SHIPPED | OUTPATIENT
Start: 2025-07-08

## 2025-07-08 ASSESSMENT — ACTIVITIES OF DAILY LIVING (ADL)
TAKING_MEDICATION: NEEDS ASSISTANCE
DOING_HOUSEWORK: NEEDS ASSISTANCE
BATHING: NEEDS ASSISTANCE
MANAGING_FINANCES: NEEDS ASSISTANCE
GROCERY_SHOPPING: NEEDS ASSISTANCE
DRESSING: INDEPENDENT

## 2025-07-08 NOTE — PROGRESS NOTES
Subjective   Reason for Visit: Huber Lindsey is an 75 y.o. male here for a Medicare Wellness visit.     Past Medical, Surgical, and Family History reviewed and updated in chart.    Reviewed all medications by prescribing practitioner or clinical pharmacist (such as prescriptions, OTCs, herbal therapies and supplements) and documented in the medical record.    HPI    76y/o male with HTN , DM2, Hep C s/p completion of Rx, Parkinsons, ckd stage 3      Is here with her hospitals  Radha           Patient Care Team:  Chelsea Aguillon MD as PCP - General (Family Medicine)  Chelsea Aguillon MD as PCP - [facility] PCP     Current Outpatient Medications   Medication Instructions    acetaminophen (ACETAMINOPHEN EXTRA STRENGTH) 500 mg, 2 times daily    allopurinol (ZYLOPRIM) 100 mg, oral, Daily    aspirin 81 mg, Daily    atorvastatin (LIPITOR) 20 mg, oral, Daily    carbidopa-levodopa (Sinemet)  mg tablet TAKE 3 TABLETS BY MOUTH THREE TIMES DAILY    cholecalciferol (VITAMIN D3) 1,000 Units, Daily    dapagliflozin propanediol (FARXIGA) 10 mg, oral, Daily before breakfast    famotidine (Pepcid) 20 mg tablet 1 tablet, Nightly    fluticasone (Flonase) 50 mcg/actuation nasal spray Administer into affected nostril(s).    hydroCHLOROthiazide (HYDRODIURIL) 25 mg, oral, Daily    losartan (COZAAR) 25 mg, oral, Daily    metoprolol tartrate (LOPRESSOR) 50 mg, oral, Every 12 hours    multivit-iron-minerals-folic acid (Centrum Silver) 0.4 mg-300 mcg- 250 mcg tab 1 tablet, Daily    NIFEdipine ER (ADALAT CC) 30 mg, oral, Daily    oxyBUTYnin XL (DITROPAN XL) 10 mg, oral, Daily, Do not crush, chew, or split. Take at same time each day.    sodium zirconium cyclosilicate (Lokelma) 10 gram packet TAKE 1 PACKET ONCE DAILY    Trulicity 1.5 mg, subcutaneous, Once Weekly        Social History     Tobacco Use    Smoking status: Former     Current packs/day: 0.00     Types: Cigarettes     Quit date:      Years since quittin.5  "   Smokeless tobacco: Never   Substance Use Topics    Alcohol use: Not Currently        Review of Systems  Constitutional: no chills, no fever and no night sweats.     Eyes: no blurred vision and no eyesight problems.     ENT: no hearing loss, no nasal congestion, no nasal discharge, no hoarseness and no sore throat.     Cardiovascular: no chest pain, no intermittent leg claudication, no lower extremity edema, no palpitations and no syncope.     Respiratory: no cough, no shortness of breath during exertion, no shortness of breath at rest and no wheezing.     Gastrointestinal: no abdominal pain, no constipation, no blood in stools, no diarrhea, no melena, no nausea, no rectal pain and no vomiting.     Genitourinary: no dysuria, no change in urinary frequency, no urinary hesitancy and no feelings of urinary urgency.     Musculoskeletal: no arthralgias, no back pain and no myalgias.     Integumentary: no new skin lesions and no rashes.     Neurological: no difficulty walking, no headache, no limb weakness, no numbness and no tingling.     Psychiatric: no anxiety, no depression, no anhedonia and no substance use disorders.     Endocrine: no recent weight gain and no recent weight loss.     Hematologic/Lymphatic: no tendency for easy bruising and no swollen glands.          All other systems have been reviewed and are negative for complaint.      Objective   Vitals:  /78   Pulse 85   Ht 1.88 m (6' 2\")   Wt 132 kg (292 lb)   SpO2 93%   BMI 37.49 kg/m²       Physical Exam  Constitutional: Alert and in no acute distress. Well developed, well nourished.     Eyes: Normal external exam. Pupils were equal in size, round, reactive to light (PERRL) with normal accommodation and extraocular movements intact (EOMI).     Ears, Nose, Mouth, and Throat: External inspection of ears and nose: Normal.  Otoscopic examination: Normal.      Neck: No neck mass was observed. Supple.     Cardiovascular: Heart rate and rhythm were " normal, normal S1 and S2, no gallops, no murmurs and no pericardial rub    Pulmonary: No respiratory distress. Clear bilateral breath sounds.     Abdomen: Soft nontender; no abdominal mass palpated. No organomegaly.     Musculoskeletal: No joint swelling seen, normal movements of all extremities. Range of motion: Normal.  Muscle strength/tone: Normal.        Neurologic: Deep tendon reflexes were 2+ and symmetric. Sensation: Normal.     Psychiatric: Judgment and insight: Intact. Mood and affect: Normal.        Assessment/Plan   Problem List Items Addressed This Visit       RESOLVED: Diabetic peripheral neuropathy (Multi)    Mixed hyperlipidemia    Overview   Formatting of this note might be different from the original.   8-2018 Formatting of this note might be different from the original.   8-2018         Relevant Medications    atorvastatin (Lipitor) 20 mg tablet    Other Relevant Orders    TSH with reflex to Free T4 if abnormal    Lipid Panel    HTN (hypertension)    Overview   Formatting of this note might be different from the original. Overview: Continue Amlodipine and lisinopril Hold HCTZ         Relevant Medications    hydroCHLOROthiazide (HYDRODiuril) 25 mg tablet    losartan (Cozaar) 25 mg tablet    NIFEdipine ER (NIFEdipine CC) 30 mg 24 hr tablet    metoprolol tartrate (Lopressor) 50 mg tablet    Gout    Overview   Formatting of this note might be different from the original. Overview: Hx of gout with flare only once in the past. States was recently re-started on HCTZ for BP. Appreciate Rheum input S/p arthrocentesis and no crystal seen on microscopy, synovial fluid culture negative Plan: Continue colchicine Add indomentacin for pain Switch to oral prednisone and taper Formatting of this note might be different from the original. Hx of gout with flare only once in the past. States was recently re-started on HCTZ for BP. Appreciate Rheum input S/p arthrocentesis and no crystal seen on  microscopy, synovial fluid culture negative Plan: Continue colchicine Add indomentacin for pain Switch to oral prednisone and taper Overview: Overview: Hx of gout with flare only once in the past. States was recently re-started on HCTZ for BP. Appreciate Rheum input S/p arthrocentesis and no crystal seen on microscopy, synovial fluid culture negative Plan: Continue colchicine Add indomentacin for pain Switch to oral prednisone and taper         Relevant Medications    allopurinol (Zyloprim) 100 mg tablet    RESOLVED: Liver cirrhosis (Multi)    Type 2 diabetes mellitus with stage 3b chronic kidney disease, without long-term current use of insulin (Multi)    Overview   Formatting of this note might be different from the original. Overview: On metformin at home HbA1C 6.5 BG elevated due to being on steroids. Plan: Hold metformin, SSI Formatting of this note might be different from the original. On metformin at home HbA1C 6.5 BG elevated due to being on steroids. Plan: Hold metformin, SSI         Relevant Orders    CBC    Basic Metabolic Panel    Basic Metabolic Panel    Hemoglobin A1C    Hyperuricemia    Relevant Medications    allopurinol (Zyloprim) 100 mg tablet    RESOLVED: Type 2 diabetes mellitus with diabetic peripheral angiopathy without gangrene (Multi)    Relevant Orders    Hemoglobin A1C    Albumin-Creatinine Ratio, Urine Random    Obesity, morbid (Multi)     Other Visit Diagnoses         Medicare annual wellness visit, subsequent    -  Primary      Diabetes mellitus without complication        Relevant Medications    dulaglutide (Trulicity) 1.5 mg/0.5 mL pen injector injection      Other urinary incontinence        Relevant Medications    oxyBUTYnin XL (Ditropan XL) 10 mg 24 hr tablet      Screen for colon cancer        Relevant Orders    Cologuard® colon cancer screening          74y/o male with HTN , DM2, Hep C s/p completion of Rx, Parkinson's, cirrhosis of liver , H/o gout      Chronic medical  conditions: Reviewed , assessed.  - HTN : at goal   - DM2 :  due for A1c  - HPL : on statin   - Morbid obesity :  gained 20lbs since Dec visit   - urinary incontinence : reports incontinence when he naps during daytime but does not care to change his diaper per sis   Trial of oxybutynin   There has been lack of self motivation towards self care historically.  Concern for poor hygiene.    ------------------------------  Update :   Results reviewed   A1c worsened   Increase Trulicity to 3mg/ week   Need for restraint of PO intake readdressed  ---------------  Conditions addressed and mgmt as noted above.  Pertinent labs, images/ imaging reports , chart review was done .   Age appropriate labs / labs for mgmt of chronic medical conditions ordered, further mgmt pending the results.        Influenza: influenza vaccine in the fall   Pneumovax 23/Prevnar 15: Pneumovax 23/Prevnar 15 vaccine was previously given.   Prevnar 20: Prevnar 20 vaccine was previously given.   Shingles Vaccine: Shingles vaccine was given previously  Prostate cancer screening: Screening current 12/'24   Colorectal Cancer Screening: Sister reported getting the colonoscopy done in the last 10 years, suggested that she call that physician's office and find out when he is due for the next one.  8/27/24 : Cologuard was reportedly received t home but never mailed back with stool sample .   Declined colonoscopy     Abdominal Aortic Aneurysm screening: screening not indicated.      RTO  in 4m             RTO in  4m or sooner if needed . Labs to be done  today.      For the sake of billing , entire note needs to be taken into consideration. Repetition of meds is avoided for the sake of redundancy. Med list above reflects reconciled meds. Management of abnormal results may not always result in an addendum to the note , an annotation at the end of result or communication via pt portal is to be considered .    This note is intended for the physician writing it,  as well as to communicate findings to other healthcare professionals. These notes use medical lexicon that may be misunderstood by non medical persons. Therefore, interpretations of medical notes and terminology should be approached with caution.

## 2025-07-09 LAB
ANION GAP SERPL CALCULATED.4IONS-SCNC: 14 MMOL/L (CALC) (ref 7–17)
BUN SERPL-MCNC: 49 MG/DL (ref 7–25)
BUN/CREAT SERPL: 36 (CALC) (ref 6–22)
CALCIUM SERPL-MCNC: 9.5 MG/DL (ref 8.6–10.3)
CHLORIDE SERPL-SCNC: 99 MMOL/L (ref 98–110)
CHOLEST SERPL-MCNC: 143 MG/DL
CHOLEST/HDLC SERPL: 3.1 (CALC)
CO2 SERPL-SCNC: 23 MMOL/L (ref 20–32)
CREAT SERPL-MCNC: 1.38 MG/DL (ref 0.7–1.28)
EGFRCR SERPLBLD CKD-EPI 2021: 54 ML/MIN/1.73M2
ERYTHROCYTE [DISTWIDTH] IN BLOOD BY AUTOMATED COUNT: 12.8 % (ref 11–15)
EST. AVERAGE GLUCOSE BLD GHB EST-MCNC: 229 MG/DL
EST. AVERAGE GLUCOSE BLD GHB EST-SCNC: 12.7 MMOL/L
GLUCOSE SERPL-MCNC: 290 MG/DL (ref 65–99)
HBA1C MFR BLD: 9.6 %
HCT VFR BLD AUTO: 46.7 % (ref 38.5–50)
HDLC SERPL-MCNC: 46 MG/DL
HGB BLD-MCNC: 16 G/DL (ref 13.2–17.1)
LDLC SERPL CALC-MCNC: 66 MG/DL (CALC)
MCH RBC QN AUTO: 32 PG (ref 27–33)
MCHC RBC AUTO-ENTMCNC: 34.3 G/DL (ref 32–36)
MCV RBC AUTO: 93.4 FL (ref 80–100)
NONHDLC SERPL-MCNC: 97 MG/DL (CALC)
PLATELET # BLD AUTO: 234 THOUSAND/UL (ref 140–400)
PMV BLD REES-ECKER: 10.1 FL (ref 7.5–12.5)
POTASSIUM SERPL-SCNC: 4.7 MMOL/L (ref 3.5–5.3)
RBC # BLD AUTO: 5 MILLION/UL (ref 4.2–5.8)
SODIUM SERPL-SCNC: 136 MMOL/L (ref 135–146)
TRIGL SERPL-MCNC: 246 MG/DL
TSH SERPL-ACNC: 1.82 MIU/L (ref 0.4–4.5)
WBC # BLD AUTO: 9.4 THOUSAND/UL (ref 3.8–10.8)

## 2025-07-10 ENCOUNTER — TELEPHONE (OUTPATIENT)
Dept: PRIMARY CARE | Facility: CLINIC | Age: 75
End: 2025-07-10
Payer: COMMERCIAL

## 2025-08-11 DIAGNOSIS — N39.498 OTHER URINARY INCONTINENCE: ICD-10-CM

## 2025-08-12 RX ORDER — OXYBUTYNIN CHLORIDE 10 MG/1
10 TABLET, EXTENDED RELEASE ORAL DAILY
Qty: 90 TABLET | Refills: 0 | Status: SHIPPED | OUTPATIENT
Start: 2025-08-12 | End: 2026-08-12

## 2025-08-30 DIAGNOSIS — E11.9 DIABETES MELLITUS WITHOUT COMPLICATION: ICD-10-CM

## 2025-09-03 RX ORDER — DAPAGLIFLOZIN 10 MG/1
10 TABLET, FILM COATED ORAL EVERY MORNING
Qty: 90 TABLET | Refills: 3 | Status: SHIPPED | OUTPATIENT
Start: 2025-09-03

## 2025-10-07 ENCOUNTER — APPOINTMENT (OUTPATIENT)
Dept: NEUROLOGY | Facility: CLINIC | Age: 75
End: 2025-10-07
Payer: COMMERCIAL

## 2025-11-11 ENCOUNTER — APPOINTMENT (OUTPATIENT)
Dept: PRIMARY CARE | Facility: CLINIC | Age: 75
End: 2025-11-11
Payer: COMMERCIAL